# Patient Record
Sex: MALE | Race: WHITE | NOT HISPANIC OR LATINO | Employment: STUDENT | URBAN - METROPOLITAN AREA
[De-identification: names, ages, dates, MRNs, and addresses within clinical notes are randomized per-mention and may not be internally consistent; named-entity substitution may affect disease eponyms.]

---

## 2021-07-16 RX ORDER — SERTRALINE HYDROCHLORIDE 25 MG/1
25 TABLET, FILM COATED ORAL DAILY
COMMUNITY
Start: 2021-06-22 | End: 2021-09-25

## 2021-07-19 ENCOUNTER — OFFICE VISIT (OUTPATIENT)
Dept: FAMILY MEDICINE CLINIC | Facility: CLINIC | Age: 36
End: 2021-07-19
Payer: COMMERCIAL

## 2021-07-19 VITALS
TEMPERATURE: 97.5 F | BODY MASS INDEX: 29.39 KG/M2 | OXYGEN SATURATION: 98 % | HEART RATE: 54 BPM | WEIGHT: 217 LBS | HEIGHT: 72 IN | SYSTOLIC BLOOD PRESSURE: 162 MMHG | DIASTOLIC BLOOD PRESSURE: 84 MMHG | RESPIRATION RATE: 18 BRPM

## 2021-07-19 DIAGNOSIS — Z13.29 SCREENING FOR THYROID DISORDER: ICD-10-CM

## 2021-07-19 DIAGNOSIS — Z13.220 SCREENING FOR HYPERLIPIDEMIA: ICD-10-CM

## 2021-07-19 DIAGNOSIS — Z13.31 DEPRESSION SCREENING NEGATIVE: ICD-10-CM

## 2021-07-19 DIAGNOSIS — F32.A DEPRESSIVE DISORDER: ICD-10-CM

## 2021-07-19 DIAGNOSIS — Z13.1 SCREENING FOR DIABETES MELLITUS (DM): ICD-10-CM

## 2021-07-19 DIAGNOSIS — Z11.1 VISIT FOR TB SKIN TEST: ICD-10-CM

## 2021-07-19 DIAGNOSIS — M54.12 CERVICAL RADICULOPATHY: ICD-10-CM

## 2021-07-19 DIAGNOSIS — F41.9 ANXIETY: ICD-10-CM

## 2021-07-19 DIAGNOSIS — Z11.59 ENCOUNTER FOR HEPATITIS C SCREENING TEST FOR LOW RISK PATIENT: ICD-10-CM

## 2021-07-19 DIAGNOSIS — Z11.4 SCREENING FOR HIV (HUMAN IMMUNODEFICIENCY VIRUS): ICD-10-CM

## 2021-07-19 DIAGNOSIS — F43.10 PTSD (POST-TRAUMATIC STRESS DISORDER): ICD-10-CM

## 2021-07-19 DIAGNOSIS — Z76.89 ENCOUNTER TO ESTABLISH CARE: Primary | ICD-10-CM

## 2021-07-19 DIAGNOSIS — Z13.0 SCREENING FOR DEFICIENCY ANEMIA: ICD-10-CM

## 2021-07-19 PROCEDURE — 99203 OFFICE O/P NEW LOW 30 MIN: CPT | Performed by: NURSE PRACTITIONER

## 2021-07-19 PROCEDURE — 3725F SCREEN DEPRESSION PERFORMED: CPT | Performed by: NURSE PRACTITIONER

## 2021-07-19 NOTE — PROGRESS NOTES
Assessment/Plan:    Problem List Items Addressed This Visit     Depressive disorder    Relevant Medications    sertraline (ZOLOFT) 50 mg tablet    Anxiety    Cervical radiculopathy    PTSD (post-traumatic stress disorder)    Relevant Medications    sertraline (ZOLOFT) 50 mg tablet    Adult BMI 29 0-29 9 kg/sq m      Other Visit Diagnoses     Encounter to establish care    -  Primary    Screening for deficiency anemia        Relevant Orders    CBC and differential    Screening for diabetes mellitus (DM)        Relevant Orders    Comprehensive metabolic panel    Screening for hyperlipidemia        Relevant Orders    Lipid panel    Screening for thyroid disorder        Relevant Orders    TSH, 3rd generation with Free T4 reflex    Encounter for hepatitis C screening test for low risk patient        Relevant Orders    Hepatitis C antibody    Screening for HIV (human immunodeficiency virus)        Relevant Orders    HIV 1/2 Antigen/Antibody (4th Generation) w Reflex [facility]    Visit for TB skin test        Relevant Orders    Quantiferon TB Gold Plus    Depression screening negative               Diagnoses and all orders for this visit:    Encounter to establish care    Screening for deficiency anemia  -     CBC and differential; Future    Screening for diabetes mellitus (DM)  -     Comprehensive metabolic panel; Future    Screening for hyperlipidemia  -     Lipid panel; Future    Screening for thyroid disorder  -     TSH, 3rd generation with Free T4 reflex; Future    Encounter for hepatitis C screening test for low risk patient  -     Hepatitis C antibody; Future    Screening for HIV (human immunodeficiency virus)  -     HIV 1/2 Antigen/Antibody (4th Generation) w Reflex [facility]; Future    Visit for TB skin test  -     Quantiferon TB Gold Plus;  Future    PTSD (post-traumatic stress disorder)    Cervical radiculopathy    Anxiety    Depressive disorder    Adult BMI 29 0-29 9 kg/sq m    Depression screening negative    Other orders  -     sertraline (ZOLOFT) 50 mg tablet; TAKE ONE-HALF TABLET BY MOUTH EVERY DAY FOR MOOD        No problem-specific Assessment & Plan notes found for this encounter  Subjective:      Patient ID: Lai Romeo is a 37 y o  male  Presents today to establish care at this office and routine visit  Previous PCP At [facility]  Significant Hx of PTSD, Anxiety,and depression  He is not currently prescribed any medications for these condiitons  Needs physical for [employer] school  Pt doing well overall, offers no acute complaints today  Discussed with Lai Romeo CDC recommendations for HIV screening for Patients between 13 y o  and 72 y o  Noel Ronni acknowledges the test and order for HIV screening placed  Risks and benefits of Hep C testing discussed to screen for Hep C infection  Lai Romeo agreeable to the test and order for Hep C ab placed  The following portions of the patient's history were reviewed and updated as appropriate:   He has a past medical history of Asthma ,  does not have any pertinent problems on file  ,   has a past surgical history that includes Bone Resection, Humerus (Right); Eye surgery (Bilateral); and TONSILECTOMY AND ADNOIDECTOMY  ,  family history includes Prostate cancer in his father  ,   reports that he has never smoked  His smokeless tobacco use includes chew  No history on file for alcohol use and drug use ,  has No Known Allergies     Current Outpatient Medications   Medication Sig Dispense Refill    sertraline (ZOLOFT) 50 mg tablet TAKE ONE-HALF TABLET BY MOUTH EVERY DAY FOR MOOD       No current facility-administered medications for this visit  BMI Counseling: Body mass index is 29 43 kg/m²  The BMI is above normal  Nutrition recommendations include decreasing portion sizes, consuming healthier snacks, limiting drinks that contain sugar, moderation in carbohydrate intake and reducing intake of cholesterol   Exercise recommendations include exercising 3-5 times per week  No pharmacotherapy was ordered  Depression Screening and Follow-up Plan: Patient's depression screening was positive with a PHQ-2 score of 0  Clincally patient does not have depression  No treatment is required  Review of Systems   Constitutional: Negative  HENT: Negative  Eyes: Negative  Respiratory: Negative  Cardiovascular: Negative  Gastrointestinal: Negative  Endocrine: Negative  Genitourinary: Negative  Musculoskeletal: Negative  Skin: Negative  Allergic/Immunologic: Negative  Neurological: Negative  Hematological: Negative  Psychiatric/Behavioral: Negative  Objective:  Vitals:    07/19/21 0720   BP: 162/84   Pulse: (!) 54   Resp: 18   Temp: 97 5 °F (36 4 °C)   SpO2: 98%   Weight: 98 4 kg (217 lb)   Height: 6' (1 829 m)     Body mass index is 29 43 kg/m²  Physical Exam  Vitals and nursing note reviewed  Constitutional:       Appearance: Normal appearance  He is well-developed  HENT:      Head: Normocephalic and atraumatic  Right Ear: Tympanic membrane, ear canal and external ear normal       Left Ear: Tympanic membrane, ear canal and external ear normal       Nose: Nose normal       Mouth/Throat:      Mouth: Mucous membranes are moist       Pharynx: Uvula midline  Eyes:      General: Lids are normal       Conjunctiva/sclera: Conjunctivae normal       Pupils: Pupils are equal, round, and reactive to light  Neck:      Thyroid: No thyroid mass  Vascular: No JVD  Trachea: Trachea and phonation normal    Cardiovascular:      Rate and Rhythm: Normal rate and regular rhythm  Pulses: Normal pulses  Heart sounds: Normal heart sounds, S1 normal and S2 normal  No murmur heard  No friction rub  No gallop  Pulmonary:      Effort: Pulmonary effort is normal       Breath sounds: Normal breath sounds  Abdominal:      General: Bowel sounds are normal       Palpations: Abdomen is soft  Tenderness: There is no abdominal tenderness  Genitourinary:     Comments: Deferred  Musculoskeletal:         General: Normal range of motion  Cervical back: Full passive range of motion without pain, normal range of motion and neck supple  Right lower leg: No edema  Left lower leg: No edema  Lymphadenopathy:      Head:      Right side of head: No submental, submandibular, tonsillar, preauricular, posterior auricular or occipital adenopathy  Left side of head: No submental, submandibular, tonsillar, preauricular, posterior auricular or occipital adenopathy  Cervical: No cervical adenopathy  Skin:     General: Skin is warm and dry  Capillary Refill: Capillary refill takes less than 2 seconds  Neurological:      General: No focal deficit present  Mental Status: He is alert and oriented to person, place, and time  Cranial Nerves: Cranial nerves are intact  Sensory: Sensation is intact  Motor: Motor function is intact  Coordination: Coordination is intact  Gait: Gait is intact  Psychiatric:         Attention and Perception: Attention and perception normal          Mood and Affect: Mood and affect normal          Speech: Speech normal          Behavior: Behavior normal  Behavior is cooperative  Thought Content:  Thought content normal          Cognition and Memory: Cognition normal          Judgment: Judgment normal

## 2021-07-20 ENCOUNTER — APPOINTMENT (OUTPATIENT)
Dept: LAB | Facility: CLINIC | Age: 36
End: 2021-07-20
Payer: COMMERCIAL

## 2021-07-20 DIAGNOSIS — Z13.1 SCREENING FOR DIABETES MELLITUS (DM): ICD-10-CM

## 2021-07-20 DIAGNOSIS — Z13.29 SCREENING FOR THYROID DISORDER: ICD-10-CM

## 2021-07-20 DIAGNOSIS — Z13.220 SCREENING FOR HYPERLIPIDEMIA: ICD-10-CM

## 2021-07-20 DIAGNOSIS — Z11.59 ENCOUNTER FOR HEPATITIS C SCREENING TEST FOR LOW RISK PATIENT: ICD-10-CM

## 2021-07-20 DIAGNOSIS — Z11.4 SCREENING FOR HIV (HUMAN IMMUNODEFICIENCY VIRUS): ICD-10-CM

## 2021-07-20 DIAGNOSIS — Z13.0 SCREENING FOR DEFICIENCY ANEMIA: ICD-10-CM

## 2021-07-20 DIAGNOSIS — Z11.1 VISIT FOR TB SKIN TEST: ICD-10-CM

## 2021-07-20 LAB
ALBUMIN SERPL BCP-MCNC: 4.3 G/DL (ref 3.5–5)
ALP SERPL-CCNC: 64 U/L (ref 46–116)
ALT SERPL W P-5'-P-CCNC: 29 U/L (ref 12–78)
ANION GAP SERPL CALCULATED.3IONS-SCNC: 4 MMOL/L (ref 4–13)
AST SERPL W P-5'-P-CCNC: 17 U/L (ref 5–45)
BASOPHILS # BLD AUTO: 0.04 THOUSANDS/ΜL (ref 0–0.1)
BASOPHILS NFR BLD AUTO: 1 % (ref 0–1)
BILIRUB SERPL-MCNC: 0.51 MG/DL (ref 0.2–1)
BUN SERPL-MCNC: 14 MG/DL (ref 5–25)
CALCIUM SERPL-MCNC: 9.5 MG/DL (ref 8.3–10.1)
CHLORIDE SERPL-SCNC: 105 MMOL/L (ref 100–108)
CHOLEST SERPL-MCNC: 204 MG/DL (ref 50–200)
CO2 SERPL-SCNC: 28 MMOL/L (ref 21–32)
CREAT SERPL-MCNC: 1.06 MG/DL (ref 0.6–1.3)
EOSINOPHIL # BLD AUTO: 0.2 THOUSAND/ΜL (ref 0–0.61)
EOSINOPHIL NFR BLD AUTO: 4 % (ref 0–6)
ERYTHROCYTE [DISTWIDTH] IN BLOOD BY AUTOMATED COUNT: 13.2 % (ref 11.6–15.1)
GFR SERPL CREATININE-BSD FRML MDRD: 92 ML/MIN/1.73SQ M
GLUCOSE P FAST SERPL-MCNC: 90 MG/DL (ref 65–99)
HCT VFR BLD AUTO: 47.5 % (ref 36.5–49.3)
HCV AB SER QL: NORMAL
HDLC SERPL-MCNC: 86 MG/DL
HGB BLD-MCNC: 15.7 G/DL (ref 12–17)
IMM GRANULOCYTES # BLD AUTO: 0.01 THOUSAND/UL (ref 0–0.2)
IMM GRANULOCYTES NFR BLD AUTO: 0 % (ref 0–2)
LDLC SERPL CALC-MCNC: 93 MG/DL (ref 0–100)
LYMPHOCYTES # BLD AUTO: 1.77 THOUSANDS/ΜL (ref 0.6–4.47)
LYMPHOCYTES NFR BLD AUTO: 31 % (ref 14–44)
MCH RBC QN AUTO: 29.2 PG (ref 26.8–34.3)
MCHC RBC AUTO-ENTMCNC: 33.1 G/DL (ref 31.4–37.4)
MCV RBC AUTO: 89 FL (ref 82–98)
MONOCYTES # BLD AUTO: 0.36 THOUSAND/ΜL (ref 0.17–1.22)
MONOCYTES NFR BLD AUTO: 6 % (ref 4–12)
NEUTROPHILS # BLD AUTO: 3.36 THOUSANDS/ΜL (ref 1.85–7.62)
NEUTS SEG NFR BLD AUTO: 58 % (ref 43–75)
NONHDLC SERPL-MCNC: 118 MG/DL
NRBC BLD AUTO-RTO: 0 /100 WBCS
PLATELET # BLD AUTO: 194 THOUSANDS/UL (ref 149–390)
PMV BLD AUTO: 10.5 FL (ref 8.9–12.7)
POTASSIUM SERPL-SCNC: 4.2 MMOL/L (ref 3.5–5.3)
PROT SERPL-MCNC: 8.1 G/DL (ref 6.4–8.2)
RBC # BLD AUTO: 5.37 MILLION/UL (ref 3.88–5.62)
SODIUM SERPL-SCNC: 137 MMOL/L (ref 136–145)
TRIGL SERPL-MCNC: 124 MG/DL
TSH SERPL DL<=0.05 MIU/L-ACNC: 1.89 UIU/ML (ref 0.36–3.74)
WBC # BLD AUTO: 5.74 THOUSAND/UL (ref 4.31–10.16)

## 2021-07-20 PROCEDURE — 86480 TB TEST CELL IMMUN MEASURE: CPT

## 2021-07-20 PROCEDURE — 87389 HIV-1 AG W/HIV-1&-2 AB AG IA: CPT

## 2021-07-20 PROCEDURE — 86803 HEPATITIS C AB TEST: CPT

## 2021-07-20 PROCEDURE — 36415 COLL VENOUS BLD VENIPUNCTURE: CPT

## 2021-07-20 PROCEDURE — 80061 LIPID PANEL: CPT

## 2021-07-20 PROCEDURE — 84443 ASSAY THYROID STIM HORMONE: CPT

## 2021-07-20 PROCEDURE — 85025 COMPLETE CBC W/AUTO DIFF WBC: CPT

## 2021-07-20 PROCEDURE — 80053 COMPREHEN METABOLIC PANEL: CPT

## 2021-07-22 LAB
GAMMA INTERFERON BACKGROUND BLD IA-ACNC: 0.02 IU/ML
M TB IFN-G BLD-IMP: NEGATIVE
M TB IFN-G CD4+ BCKGRND COR BLD-ACNC: 0 IU/ML
M TB IFN-G CD4+ BCKGRND COR BLD-ACNC: 0 IU/ML
MITOGEN IGNF BCKGRD COR BLD-ACNC: >10 IU/ML

## 2021-07-24 LAB — HIV 1+2 AB+HIV1 P24 AG SERPL QL IA: NORMAL

## 2021-07-29 ENCOUNTER — OFFICE VISIT (OUTPATIENT)
Dept: FAMILY MEDICINE CLINIC | Facility: CLINIC | Age: 36
End: 2021-07-29
Payer: COMMERCIAL

## 2021-07-29 VITALS
HEIGHT: 72 IN | WEIGHT: 210 LBS | SYSTOLIC BLOOD PRESSURE: 128 MMHG | OXYGEN SATURATION: 97 % | TEMPERATURE: 96.7 F | HEART RATE: 52 BPM | BODY MASS INDEX: 28.44 KG/M2 | DIASTOLIC BLOOD PRESSURE: 84 MMHG

## 2021-07-29 DIAGNOSIS — F32.A DEPRESSIVE DISORDER: ICD-10-CM

## 2021-07-29 DIAGNOSIS — Z13.220 SCREENING FOR HYPERLIPIDEMIA: ICD-10-CM

## 2021-07-29 DIAGNOSIS — F43.10 PTSD (POST-TRAUMATIC STRESS DISORDER): ICD-10-CM

## 2021-07-29 DIAGNOSIS — Z00.00 ANNUAL PHYSICAL EXAM: Primary | ICD-10-CM

## 2021-07-29 DIAGNOSIS — F41.9 ANXIETY: ICD-10-CM

## 2021-07-29 DIAGNOSIS — Z13.29 SCREENING FOR THYROID DISORDER: ICD-10-CM

## 2021-07-29 DIAGNOSIS — Z13.0 SCREENING FOR DEFICIENCY ANEMIA: ICD-10-CM

## 2021-07-29 DIAGNOSIS — Z13.1 SCREENING FOR DIABETES MELLITUS: ICD-10-CM

## 2021-07-29 PROCEDURE — 1036F TOBACCO NON-USER: CPT | Performed by: NURSE PRACTITIONER

## 2021-07-29 PROCEDURE — 3008F BODY MASS INDEX DOCD: CPT | Performed by: NURSE PRACTITIONER

## 2021-07-29 PROCEDURE — 99395 PREV VISIT EST AGE 18-39: CPT | Performed by: NURSE PRACTITIONER

## 2021-07-29 NOTE — PROGRESS NOTES
ADULT ANNUAL [facility] PRIMARY CARE    NAME: Holden Gomez  AGE: 37 y o  SEX: male  : 1985     DATE: 2021     Assessment and Plan:     Problem List Items Addressed This Visit     Depressive disorder    Anxiety    PTSD (post-traumatic stress disorder)      Other Visit Diagnoses     Annual physical exam    -  Primary    Screening for deficiency anemia        Relevant Orders    CBC and differential    Screening for diabetes mellitus        Relevant Orders    Comprehensive metabolic panel    Screening for thyroid disorder        Relevant Orders    TSH, 3rd generation with Free T4 reflex    Screening for hyperlipidemia        Relevant Orders    Lipid Panel with Direct LDL reflex          Immunizations and preventive care screenings were discussed with patient today  Appropriate education was printed on patient's after visit summary  Counseling:  Alcohol/drug use: discussed moderation in alcohol intake, the recommendations for healthy alcohol use, and avoidance of illicit drug use  Dental Health: discussed importance of regular tooth brushing, flossing, and dental visits  Injury prevention: discussed safety/seat belts, safety helmets, smoke detectors, carbon dioxide detectors, and smoking near bedding or upholstery  Sexual health: discussed sexually transmitted diseases, partner selection, use of condoms, avoidance of unintended pregnancy, and contraceptive alternatives  · Exercise: the importance of regular exercise/physical activity was discussed  Recommend exercise 3-5 times per week for at least 30 minutes  Return in about 1 year (around 2022) for PUT ON FLU VACC LIST, Schedule in JT  Chief Complaint:     Chief Complaint   Patient presents with    Annual Exam      History of Present Illness:     Adult Annual Physical   Patient here for a comprehensive physical exam  The patient reports no problems      Diet and Physical Activity  · Diet/Nutrition: well balanced diet, limited junk food, consuming 3-5 servings of fruits/vegetables daily and adequate fiber intake  · Exercise: vigorous cardiovascular exercise, strength training exercises, 5-7 times a week on average and 1-2 hours on average  Depression Screening  PHQ-9 Depression Screening    PHQ-9:   Frequency of the following problems over the past two weeks:           General Health  · Sleep: sleeps poorly and gets 4-6 hours of sleep on average  · Hearing: normal - bilateral and has chronic tinnitis  · Vision: no vision problems and most recent eye exam >1 year ago  · Dental: no dental visits for >1 year, brushes teeth twice daily and flosses teeth occasionally   Health  · History of STDs?: yes  [facility]  Review of Systems:     Review of Systems   Constitutional: Negative  HENT: Negative  Eyes: Negative  Respiratory: Negative  Cardiovascular: Negative  Gastrointestinal: Negative  Endocrine: Negative  Genitourinary: Negative  Musculoskeletal: Negative  Skin: Negative  Allergic/Immunologic: Negative  Neurological: Negative  Hematological: Negative  Psychiatric/Behavioral: Negative  Past Medical History:     Past Medical History:   Diagnosis Date    Asthma       Past Surgical History:     Past Surgical History:   Procedure Laterality Date    BONE RESECTION, HUMERUS Right     EYE SURGERY Bilateral     TONSILECTOMY AND ADNOIDECTOMY        Social History:     Social History     Socioeconomic History    Marital status: /Civil Union     Spouse name: None    Number of children: None    Years of education: None    Highest education level: None   Occupational History    Occupation: unemployed    Tobacco Use    Smoking status: Never Smoker    Smokeless tobacco: Current User     Types: Chew   Vaping Use    Vaping Use: Never used   Substance and Sexual Activity    Alcohol use:  Yes Comment: 6 pk week     Drug use: Not Currently    Sexual activity: None   Other Topics Concern    None   Social History Narrative    None     Social Determinants of Health     Financial Resource Strain:     Difficulty of Paying Living Expenses:    Food Insecurity:     Worried About Running Out of Food in the Last Year:     Ran Out of Food in the Last Year:    Transportation Needs:     Lack of Transportation (Medical):  Lack of Transportation (Non-Medical):    Physical Activity:     Days of Exercise per Week:     Minutes of Exercise per Session:    Stress:     Feeling of Stress :    Social Connections:     Frequency of Communication with Friends and Family:     Frequency of Social Gatherings with Friends and Family:     Attends Protestant Services:     Active Member of Clubs or Organizations:     Attends Club or Organization Meetings:     Marital Status:    Intimate Partner Violence:     Fear of Current or Ex-Partner:     Emotionally Abused:     Physically Abused:     Sexually Abused:       Family History:     Family History   Problem Relation Age of Onset    Prostate cancer Father       Current Medications:     Current Outpatient Medications   Medication Sig Dispense Refill    sertraline (ZOLOFT) 25 mg tablet Take 25 mg by mouth daily        No current facility-administered medications for this visit  Allergies:     No Known Allergies   Physical Exam:     /84 (BP Location: Left arm, Patient Position: Sitting, Cuff Size: Large)   Pulse (!) 52   Temp (!) 96 7 °F (35 9 °C) (Tympanic)   Ht 6' (1 829 m)   Wt 95 3 kg (210 lb)   SpO2 97%   BMI 28 48 kg/m²     Physical Exam  Vitals and nursing note reviewed  Constitutional:       Appearance: Normal appearance  He is well-developed  HENT:      Head: Normocephalic and atraumatic        Right Ear: External ear normal       Left Ear: External ear normal       Nose: Nose normal    Eyes:      Conjunctiva/sclera: Conjunctivae normal  Pupils: Pupils are equal, round, and reactive to light  Cardiovascular:      Rate and Rhythm: Normal rate and regular rhythm  Heart sounds: Normal heart sounds  Pulmonary:      Effort: Pulmonary effort is normal       Breath sounds: Normal breath sounds  Abdominal:      General: Bowel sounds are normal       Palpations: Abdomen is soft  Genitourinary:     Comments: Deferred  Musculoskeletal:         General: Normal range of motion  Cervical back: Normal range of motion and neck supple  Skin:     General: Skin is warm and dry  Capillary Refill: Capillary refill takes less than 2 seconds  Neurological:      Mental Status: He is alert and oriented to person, place, and time  Psychiatric:         Behavior: Behavior normal          Thought Content:  Thought content normal          Judgment: Judgment normal           Appointment on 07/20/2021   Component Date Value Ref Range Status    WBC 07/20/2021 5 74  4 31 - 10 16 Thousand/uL Final    RBC 07/20/2021 5 37  3 88 - 5 62 Million/uL Final    Hemoglobin 07/20/2021 15 7  12 0 - 17 0 g/dL Final    Hematocrit 07/20/2021 47 5  36 5 - 49 3 % Final    MCV 07/20/2021 89  82 - 98 fL Final    MCH 07/20/2021 29 2  26 8 - 34 3 pg Final    MCHC 07/20/2021 33 1  31 4 - 37 4 g/dL Final    RDW 07/20/2021 13 2  11 6 - 15 1 % Final    MPV 07/20/2021 10 5  8 9 - 12 7 fL Final    Platelets 21/67/2596 194  149 - 390 Thousands/uL Final    nRBC 07/20/2021 0  /100 WBCs Final    Neutrophils Relative 07/20/2021 58  43 - 75 % Final    Immat GRANS % 07/20/2021 0  0 - 2 % Final    Lymphocytes Relative 07/20/2021 31  14 - 44 % Final    Monocytes Relative 07/20/2021 6  4 - 12 % Final    Eosinophils Relative 07/20/2021 4  0 - 6 % Final    Basophils Relative 07/20/2021 1  0 - 1 % Final    Neutrophils Absolute 07/20/2021 3 36  1 85 - 7 62 Thousands/µL Final    Immature Grans Absolute 07/20/2021 0 01  0 00 - 0 20 Thousand/uL Final    Lymphocytes Absolute 07/20/2021 1 77  0 60 - 4 47 Thousands/µL Final    Monocytes Absolute 07/20/2021 0 36  0 17 - 1 22 Thousand/µL Final    Eosinophils Absolute 07/20/2021 0 20  0 00 - 0 61 Thousand/µL Final    Basophils Absolute 07/20/2021 0 04  0 00 - 0 10 Thousands/µL Final    Sodium 07/20/2021 137  136 - 145 mmol/L Final    Potassium 07/20/2021 4 2  3 5 - 5 3 mmol/L Final    Chloride 07/20/2021 105  100 - 108 mmol/L Final    CO2 07/20/2021 28  21 - 32 mmol/L Final    ANION GAP 07/20/2021 4  4 - 13 mmol/L Final    BUN 07/20/2021 14  5 - 25 mg/dL Final    Creatinine 07/20/2021 1 06  0 60 - 1 30 mg/dL Final    Standardized to IDMS reference method    Glucose, Fasting 07/20/2021 90  65 - 99 mg/dL Final    Specimen collection should occur prior to Sulfasalazine administration due to the potential for falsely depressed results  Specimen collection should occur prior to Sulfapyridine administration due to the potential for falsely elevated results   Calcium 07/20/2021 9 5  8 3 - 10 1 mg/dL Final    AST 07/20/2021 17  5 - 45 U/L Final    Specimen collection should occur prior to Sulfasalazine administration due to the potential for falsely depressed results   ALT 07/20/2021 29  12 - 78 U/L Final    Specimen collection should occur prior to Sulfasalazine and/or Sulfapyridine administration due to the potential for falsely depressed results   Alkaline Phosphatase 07/20/2021 64  46 - 116 U/L Final    Total Protein 07/20/2021 8 1  6 4 - 8 2 g/dL Final    Albumin 07/20/2021 4 3  3 5 - 5 0 g/dL Final    Total Bilirubin 07/20/2021 0 51  0 20 - 1 00 mg/dL Final    Use of this assay is not recommended for patients undergoing treatment with eltrombopag due to the potential for falsely elevated results      eGFR 07/20/2021 92  ml/min/1 73sq m Final    Cholesterol 07/20/2021 204* 50 - 200 mg/dL Final    Cholesterol:       Desirable         <200 mg/dl       Borderline         200-239 mg/dl       High              >239     Triglycerides 07/20/2021 124  <=150 mg/dL Final    Triglyceride:     Normal          <150 mg/dl     Borderline High 150-199 mg/dl     High            200-499 mg/dl        Very High       >499 mg/dl    Specimen collection should occur prior to N-Acetylcysteine or Metamizole administration due to the potential for falsely depressed results   HDL, Direct 07/20/2021 86  >=40 mg/dL Final    HDL Cholesterol:       Low     <41 mg/dL  Specimen collection should occur prior to Metamizole administration due to the potential for falsley depressed results   LDL Calculated 07/20/2021 93  0 - 100 mg/dL Final    LDL Cholesterol:     Optimal           <100 mg/dl     Near Optimal      100-129 mg/dl     Above Optimal       Borderline High 130-159 mg/dl       High            160-189 mg/dl       Very High       >189 mg/dl         This screening LDL is a calculated result  It does not have the accuracy of the Direct Measured LDL in the monitoring of patients with hyperlipidemia and/or statin therapy  Direct Measure LDL (EUN055) must be ordered separately in these patients   Non-HDL-Chol (CHOL-HDL) 07/20/2021 118  mg/dl Final    TSH 3RD GENERATON 07/20/2021 1 890  0 358 - 3 740 uIU/mL Final    Hepatitis C Ab 07/20/2021 Non-reactive  Non-reactive Final    HIV-1/HIV-2 Ab 07/20/2021 Non-Reactive  Non-Reactive Final    QFT Nil 07/20/2021 0 02  0 - 8 0 IU/ml Final    QFT TB1-NIL 07/20/2021 0 00  IU/ml Final    QFT TB2-NIL 07/20/2021 0 00  IU/ml Final    QFT Mitogen-NIL 07/20/2021 >10 00  IU/ml Final    QFT Final Interpretation 07/20/2021 Negative  Negative Final    No Interferon-gamma response to M  tuberculosis antigens detected  Infection with M  tuberculosis is unlikely  A single negative result does not exclude infection with M  tuberculosis   In patients at high risk for M  tuberculosis infection, a second test should be considered in accordance with the 2017 ATS/IDSA/CDC Clinical Practice Guidelines for Diagnosis of Tuberculosis in Adults and Children  False negative results can be a result of incorrect blood sample collection or handling of the specimen affecting lymphocyte function  Elevated total cholesterol - recommend lifestyle and dietary changes which include plant based diet with white lean meats, no frying, EVOO on vegetables instead of butter, nuts, fruits, and hydration with water with a regular moderate exercise regimen 20 to 30 minutes three times a week          JARETT Spencer   [place] [id number] [facility] PRIMARY [facility]

## 2021-07-29 NOTE — PATIENT INSTRUCTIONS
Wellness Visit for Adults   AMBULATORY CARE:   A wellness visit  is when you see your healthcare provider to get screened for health problems  Your healthcare provider will also give you advice on how to stay healthy  Write down your questions so you remember to ask them  Ask your healthcare provider how often you should have a wellness visit  What happens at a wellness visit:  Your healthcare provider will ask about your health, and your family history of health problems  This includes high blood pressure, heart disease, and cancer  He or she will ask if you have symptoms that concern you, if you smoke, and about your mood  You may also be asked about your intake of medicines, supplements, food, and alcohol  Any of the following may be done:  · Your weight  will be checked  Your height may also be checked so your body mass index (BMI) can be calculated  Your BMI shows if you are at a healthy weight  · Your blood pressure  and heart rate will be checked  Your temperature may also be checked  · Blood and urine tests  may be done  Blood tests may be done to check your cholesterol levels  Abnormal cholesterol levels increase your risk for heart disease and stroke  You may also need a blood or urine test to check for diabetes if you are at increased risk  Urine tests may be done to look for signs of an infection or kidney disease  · A physical exam  includes checking your heartbeat and lungs with a stethoscope  Your healthcare provider may also check your skin to look for sun damage  · Screening tests  may be recommended  A screening test is done to check for diseases that may not cause symptoms  The screening tests you may need depend on your age, gender, family history, and lifestyle habits  For example, colorectal screening may be recommended if you are 48years old or older  Screening tests you need if you are a woman:   · A Pap smear  is used to screen for cervical cancer   Pap smears are usually done every 3 to 5 years depending on your age  You may need them more often if you have had abnormal Pap smear test results in the past  Ask your healthcare provider how often you should have a Pap smear  · A mammogram  is an x-ray of your breasts to screen for breast cancer  Experts recommend mammograms every 2 years starting at age 48 years  You may need a mammogram at age 52 years or younger if you have an increased risk for breast cancer  Talk to your healthcare provider about when you should start having mammograms and how often you need them  Vaccines you may need:   · Get an influenza vaccine  every year  The influenza vaccine protects you from the flu  Several types of viruses cause the flu  The viruses change over time, so new vaccines are made each year  · Get a tetanus-diphtheria (Td) booster vaccine  every 10 years  This vaccine protects you against tetanus and diphtheria  Tetanus is a severe infection that may cause painful muscle spasms and lockjaw  Diphtheria is a severe bacterial infection that causes a thick covering in the back of your mouth and throat  · Get a human papillomavirus (HPV) vaccine  if you are female and aged 23 to 32 or male 23 to 24 and never received it  This vaccine protects you from HPV infection  HPV is the most common infection spread by sexual contact  HPV may also cause vaginal, penile, and anal cancers  · Get a pneumococcal vaccine  if you are aged 72 years or older  The pneumococcal vaccine is an injection given to protect you from pneumococcal disease  Pneumococcal disease is an infection caused by pneumococcal bacteria  The infection may cause pneumonia, meningitis, or an ear infection  · Get a shingles vaccine  if you are 60 or older, even if you have had shingles before  The shingles vaccine is an injection to protect you from the varicella-zoster virus  This is the same virus that causes chickenpox   Shingles is a painful rash that develops in people who had chickenpox or have been exposed to the virus  How to eat healthy:  My Plate is a model for planning healthy meals  It shows the types and amounts of foods that should go on your plate  Fruits and vegetables make up about half of your plate, and grains and protein make up the other half  A serving of dairy is included on the side of your plate  The amount of calories and serving sizes you need depends on your age, gender, weight, and height  Examples of healthy foods are listed below:  · Eat a variety of vegetables  such as dark green, red, and orange vegetables  You can also include canned vegetables low in sodium (salt) and frozen vegetables without added butter or sauces  · Eat a variety of fresh fruits , canned fruit in 100% juice, frozen fruit, and dried fruit  · Include whole grains  At least half of the grains you eat should be whole grains  Examples include whole-wheat bread, wheat pasta, brown rice, and whole-grain cereals such as oatmeal     · Eat a variety of protein foods such as seafood (fish and shellfish), lean meat, and poultry without skin (turkey and chicken)  Examples of lean meats include pork leg, shoulder, or tenderloin, and beef round, sirloin, tenderloin, and extra lean ground beef  Other protein foods include eggs and egg substitutes, beans, peas, soy products, nuts, and seeds  · Choose low-fat dairy products such as skim or 1% milk or low-fat yogurt, cheese, and cottage cheese  · Limit unhealthy fats  such as butter, hard margarine, and shortening  Exercise:  Exercise at least 30 minutes per day on most days of the week  Some examples of exercise include walking, biking, dancing, and swimming  You can also fit in more physical activity by taking the stairs instead of the elevator or parking farther away from stores  Include muscle strengthening activities 2 days each week  Regular exercise provides many health benefits   It helps you manage your weight, and decreases your risk for type 2 diabetes, heart disease, stroke, and high blood pressure  Exercise can also help improve your mood  Ask your healthcare provider about the best exercise plan for you  General health and safety guidelines:   · Do not smoke  Nicotine and other chemicals in cigarettes and cigars can cause lung damage  Ask your healthcare provider for information if you currently smoke and need help to quit  E-cigarettes or smokeless tobacco still contain nicotine  Talk to your healthcare provider before you use these products  · Limit alcohol  A drink of alcohol is 12 ounces of beer, 5 ounces of wine, or 1½ ounces of liquor  · Lose weight, if needed  Being overweight increases your risk of certain health conditions  These include heart disease, high blood pressure, type 2 diabetes, and certain types of cancer  · Protect your skin  Do not sunbathe or use tanning beds  Use sunscreen with a SPF 15 or higher  Apply sunscreen at least 15 minutes before you go outside  Reapply sunscreen every 2 hours  Wear protective clothing, hats, and sunglasses when you are outside  · Drive safely  Always wear your seatbelt  Make sure everyone in your car wears a seatbelt  A seatbelt can save your life if you are in an accident  Do not use your cell phone when you are driving  This could distract you and cause an accident  Pull over if you need to make a call or send a text message  · Practice safe sex  Use latex condoms if are sexually active and have more than one partner  Your healthcare provider may recommend screening tests for sexually transmitted infections (STIs)  · Wear helmets, lifejackets, and protective gear  Always wear a helmet when you ride a bike or motorcycle, go skiing, or play sports that could cause a head injury  Wear protective equipment when you play sports  Wear a lifejacket when you are on a boat or doing water sports      © Copyright [employer] 2021 Information is for End User's use only and may not be sold, redistributed or otherwise used for commercial purposes  All illustrations and images included in CareNotes® are the copyrighted property of A D A M [employer] Inc  or Amber Youssef  The above information is an  only  It is not intended as medical advice for individual conditions or treatments  Talk to your doctor, nurse or pharmacist before following any medical regimen to see if it is safe and effective for you  Cholesterol and Your Health   AMBULATORY CARE:   Cholesterol  is a waxy, fat-like substance  Your body uses cholesterol to make hormones and new cells, and to protect nerves  Cholesterol is made by your body  It also comes from certain foods you eat, such as meat and dairy products  Your healthcare provider can help you set goals for your cholesterol levels  He or she can help you create a plan to meet your goals  Cholesterol level goals: Your cholesterol level goals depend on your risk for heart disease, your age, and your other health conditions  The following are general guidelines:  · Total cholesterol  includes low-density lipoprotein (LDL), high-density lipoprotein (HDL), and triglyceride levels  The total cholesterol level should be lower than 200 mg/dL and is best at about 150 mg/dL  · LDL cholesterol  is called bad cholesterol  because it forms plaque in your arteries  As plaque builds up, your arteries become narrow, and less blood flows through  When plaque decreases blood flow to your heart, you may have chest pain  If plaque completely blocks an artery that brings blood to your heart, you may have a heart attack  Plaque can break off and form blood clots  Blood clots may block arteries in your brain and cause a stroke  The level should be less than 130 mg/dL and is best at about 100 mg/dL  · HDL cholesterol  is called good cholesterol  because it helps remove LDL cholesterol from your arteries   It does this by attaching to LDL cholesterol and carrying it to your liver  Your liver breaks down LDL cholesterol so your body can get rid of it  High levels of HDL cholesterol can help prevent a heart attack and stroke  Low levels of HDL cholesterol can increase your risk for heart disease, heart attack, and stroke  The level should be 60 mg/dL or higher  · Triglycerides  are a type of fat that store energy from foods you eat  High levels of triglycerides also cause plaque buildup  This can increase your risk for a heart attack or stroke  If your triglyceride level is high, your LDL cholesterol level may also be high  The level should be less than 150 mg/dL  Any of the following can increase your risk for high cholesterol:   · Smoking cigarettes    · Being overweight or obese, or not getting enough exercise    · Drinking large amounts of alcohol    · A medical condition such as hypertension (high blood pressure) or diabetes    · Certain genes passed from your parents to you    · Age older than 65 years    What you need to know about having your cholesterol levels checked: Adults 21to 39years of age should have their cholesterol levels checked every 4 to 6 years  Adults 45 years or older should have their cholesterol checked every 1 to 2 years  You may need your cholesterol checked more often, or at a younger age, if you have risk factors for heart disease  You may also need to have your cholesterol checked more often if you have other health conditions, such as diabetes  Blood tests are used to check cholesterol levels  Blood tests measure your levels of triglycerides, LDL cholesterol, and HDL cholesterol  How healthy fats affect your cholesterol levels:  Healthy fats, also called unsaturated fats, help lower LDL cholesterol and triglyceride levels  Healthy fats include the following:  · Monounsaturated fats  are found in foods such as olive oil, canola oil, avocado, nuts, and olives      · Polyunsaturated fats,  such as omega 3 fats, are found in fish, such as salmon, trout, and tuna  They can also be found in plant foods such as flaxseed, walnuts, and soybeans  How unhealthy fats affect your cholesterol levels:  Unhealthy fats increase LDL cholesterol and triglyceride levels  They are found in foods high in cholesterol, saturated fat, and trans fat:  · Cholesterol  is found in eggs, dairy, and meat  · Saturated fat  is found in butter, cheese, ice cream, whole milk, and coconut oil  Saturated fat is also found in meat, such as sausage, hot dogs, and bologna  · Trans fat  is found in liquid oils and is used in fried and baked foods  Foods that contain trans fats include chips, crackers, muffins, sweet rolls, microwave popcorn, and cookies  Treatment  for high cholesterol will also decrease your risk of heart disease, heart attack, and stroke  Treatment may include any of the following:  · Lifestyle changes  may include food, exercise, weight loss, and quitting smoking  You may also need to decrease the amount of alcohol you drink  Your healthcare provider will want you to start with lifestyle changes  Other treatment may be added if lifestyle changes are not enough  Your healthcare provider may recommend you work with a team to manage hyperlipidemia  The team may include medical experts such as a dietitian, an exercise or physical therapist, and a behavior therapist  Your family members may be included in helping you create lifestyle changes  · Medicines  may be given to lower your LDL cholesterol, triglyceride levels, or total cholesterol level  You may need medicines to lower your cholesterol if any of the following is true:    ? You have a history of stroke, TIA, unstable angina, or a heart attack  ? Your LDL cholesterol level is 190 mg/dL or higher  ? You are age 36 to 76 years, have diabetes or heart disease risk factors, and your LDL cholesterol is 70 mg/dL or higher      · Supplements  include fish oil, red yeast rice, and garlic  Fish oil may help lower your triglyceride and LDL cholesterol levels  It may also increase your HDL cholesterol level  Red yeast rice may help decrease your total cholesterol level and LDL cholesterol level  Garlic may help lower your total cholesterol level  Do not take any supplements without talking to your healthcare provider  Food changes you can make to lower your cholesterol levels:  A dietitian can help you create a healthy eating plan  He or she can show you how to read food labels and choose foods low in saturated fat, trans fats, and cholesterol  · Decrease the total amount of fat you eat  Choose lean meats, fat-free or 1% fat milk, and low-fat dairy products, such as yogurt and cheese  Try to limit or avoid red meats  Limit or do not eat fried foods or baked goods, such as cookies  · Replace unhealthy fats with healthy fats  Cook foods in olive oil or canola oil  Choose soft margarines that are low in saturated fat and trans fat  Seeds, nuts, and avocados are other examples of healthy fats  · Eat foods with omega-3 fats  Examples include salmon, tuna, mackerel, walnuts, and flaxseed  Eat fish 2 times per week  Pregnant women should not eat fish that have high levels of mercury, such as shark, swordfish, and pearl mackerel  · Increase the amount of high-fiber foods you eat  High-fiber foods can help lower your LDL cholesterol  Aim to get between 20 and 30 grams of fiber each day  Fruits and vegetables are high in fiber  Eat at least 5 servings each day  Other high-fiber foods are whole-grain or whole-wheat breads, pastas, or cereals, and brown rice  Eat 3 ounces of whole-grain foods each day  Increase fiber slowly  You may have abdominal discomfort, bloating, and gas if you add fiber to your diet too quickly  · Eat healthy protein foods  Examples include low-fat dairy products, skinless chicken and turkey, fish, and nuts      · Limit foods and drinks that are high in sugar  Your dietitian or healthcare provider can help you create daily limits for high-sugar foods and drinks  The limit may be lower if you have diabetes or another health condition  Limits can also help you lose weight if needed  Lifestyle changes you can make to lower your cholesterol levels:   · Maintain a healthy weight  Ask your healthcare provider what a healthy weight is for you  Ask him or her to help you create a weight loss plan if needed  Weight loss can decrease your total cholesterol and triglyceride levels  Weight loss may also help keep your blood pressure at a healthy level  · Be physically active throughout the day  Physical activity, such as exercise, can help lower your total cholesterol level and maintain a healthy weight  Physical activity can also help increase your HDL cholesterol level  Work with your healthcare provider to create an program that is right for you  Get at least 30 to 40 minutes of moderate physical activity most days of the week  Examples of exercise include brisk walking, swimming, or biking  Also include strength training at least 2 times each week  Your healthcare providers can help you create a physical activity plan  · Do not smoke  Nicotine and other chemicals in cigarettes and cigars can raise your cholesterol levels  Ask your healthcare provider for information if you currently smoke and need help to quit  E-cigarettes or smokeless tobacco still contain nicotine  Talk to your healthcare provider before you use these products  · Limit or do not drink alcohol  Alcohol can increase your triglyceride levels  Ask your healthcare provider before you drink alcohol  Ask how much is okay for you to drink in 24 hours or 1 week  Follow up with your doctor as directed:  Write down your questions so you remember to ask them during your visits    © Copyright [employer] 2021 Information is for End User's use only and may not be sold, redistributed or otherwise used for commercial purposes  All illustrations and images included in CareNotes® are the copyrighted property of [employer]  or Amber Youssef  The above information is an  only  It is not intended as medical advice for individual conditions or treatments  Talk to your doctor, nurse or pharmacist before following any medical regimen to see if it is safe and effective for you

## 2021-09-24 ENCOUNTER — HOSPITAL ENCOUNTER (INPATIENT)
Facility: HOSPITAL | Age: 36
LOS: 1 days | Discharge: HOME | DRG: 603 | End: 2021-09-25
Attending: EMERGENCY MEDICINE | Admitting: INTERNAL MEDICINE
Payer: COMMERCIAL

## 2021-09-24 ENCOUNTER — APPOINTMENT (EMERGENCY)
Dept: RADIOLOGY | Facility: HOSPITAL | Age: 36
DRG: 603 | End: 2021-09-24
Payer: COMMERCIAL

## 2021-09-24 ENCOUNTER — OFFICE VISIT (OUTPATIENT)
Dept: URGENT CARE | Facility: CLINIC | Age: 36
End: 2021-09-24
Payer: COMMERCIAL

## 2021-09-24 VITALS
HEART RATE: 72 BPM | WEIGHT: 210 LBS | DIASTOLIC BLOOD PRESSURE: 84 MMHG | OXYGEN SATURATION: 100 % | RESPIRATION RATE: 18 BRPM | TEMPERATURE: 100.5 F | BODY MASS INDEX: 28.44 KG/M2 | SYSTOLIC BLOOD PRESSURE: 141 MMHG | HEIGHT: 72 IN

## 2021-09-24 DIAGNOSIS — L03.114 CELLULITIS OF LEFT ELBOW: Primary | ICD-10-CM

## 2021-09-24 DIAGNOSIS — L03.114 CELLULITIS OF LEFT ARM: Primary | ICD-10-CM

## 2021-09-24 DIAGNOSIS — M00.9 SEPTIC JOINT (HCC): ICD-10-CM

## 2021-09-24 LAB
ALBUMIN SERPL BCP-MCNC: 4.7 G/DL (ref 3.5–5.7)
ALP SERPL-CCNC: 74 U/L (ref 40–150)
ALT SERPL W P-5'-P-CCNC: 14 U/L (ref 7–52)
ANION GAP SERPL CALCULATED.3IONS-SCNC: 9 MMOL/L (ref 4–13)
APTT PPP: 33 SECONDS (ref 23–37)
AST SERPL W P-5'-P-CCNC: 13 U/L (ref 13–39)
BASOPHILS # BLD AUTO: 0 THOUSANDS/ΜL (ref 0–0.1)
BASOPHILS NFR BLD AUTO: 0 % (ref 0–2)
BILIRUB SERPL-MCNC: 0.4 MG/DL (ref 0.2–1)
BUN SERPL-MCNC: 16 MG/DL (ref 7–25)
CALCIUM SERPL-MCNC: 9.7 MG/DL (ref 8.6–10.5)
CHLORIDE SERPL-SCNC: 102 MMOL/L (ref 98–107)
CO2 SERPL-SCNC: 27 MMOL/L (ref 21–31)
CREAT SERPL-MCNC: 0.95 MG/DL (ref 0.7–1.3)
EOSINOPHIL # BLD AUTO: 0.1 THOUSAND/ΜL (ref 0–0.61)
EOSINOPHIL NFR BLD AUTO: 1 % (ref 0–5)
ERYTHROCYTE [DISTWIDTH] IN BLOOD BY AUTOMATED COUNT: 12.8 % (ref 11.5–14.5)
GFR SERPL CREATININE-BSD FRML MDRD: 104 ML/MIN/1.73SQ M
GLUCOSE SERPL-MCNC: 102 MG/DL (ref 65–99)
HCT VFR BLD AUTO: 42.8 % (ref 42–47)
HGB BLD-MCNC: 14.9 G/DL (ref 14–18)
INR PPP: 0.93 (ref 0.84–1.19)
LACTATE SERPL-SCNC: 0.8 MMOL/L (ref 0.5–2)
LYMPHOCYTES # BLD AUTO: 0.9 THOUSANDS/ΜL (ref 0.6–4.47)
LYMPHOCYTES NFR BLD AUTO: 9 % (ref 21–51)
MCH RBC QN AUTO: 29 PG (ref 26–34)
MCHC RBC AUTO-ENTMCNC: 34.8 G/DL (ref 31–37)
MCV RBC AUTO: 83 FL (ref 81–99)
MONOCYTES # BLD AUTO: 0.5 THOUSAND/ΜL (ref 0.17–1.22)
MONOCYTES NFR BLD AUTO: 5 % (ref 2–12)
NEUTROPHILS # BLD AUTO: 8.6 THOUSANDS/ΜL (ref 1.4–6.5)
NEUTS SEG NFR BLD AUTO: 84 % (ref 42–75)
PLATELET # BLD AUTO: 197 THOUSANDS/UL (ref 149–390)
PMV BLD AUTO: 8.1 FL (ref 8.6–11.7)
POTASSIUM SERPL-SCNC: 3.8 MMOL/L (ref 3.5–5.5)
PROT SERPL-MCNC: 7.9 G/DL (ref 6.4–8.9)
PROTHROMBIN TIME: 12.6 SECONDS (ref 11.6–14.5)
RBC # BLD AUTO: 5.13 MILLION/UL (ref 4.3–5.9)
SODIUM SERPL-SCNC: 138 MMOL/L (ref 134–143)
TROPONIN I SERPL-MCNC: <0.03 NG/ML
WBC # BLD AUTO: 10.1 THOUSAND/UL (ref 4.8–10.8)

## 2021-09-24 PROCEDURE — 85025 COMPLETE CBC W/AUTO DIFF WBC: CPT | Performed by: EMERGENCY MEDICINE

## 2021-09-24 PROCEDURE — 99285 EMERGENCY DEPT VISIT HI MDM: CPT

## 2021-09-24 PROCEDURE — 99213 OFFICE O/P EST LOW 20 MIN: CPT | Performed by: PHYSICIAN ASSISTANT

## 2021-09-24 PROCEDURE — 85610 PROTHROMBIN TIME: CPT | Performed by: EMERGENCY MEDICINE

## 2021-09-24 PROCEDURE — 85730 THROMBOPLASTIN TIME PARTIAL: CPT | Performed by: EMERGENCY MEDICINE

## 2021-09-24 PROCEDURE — 36415 COLL VENOUS BLD VENIPUNCTURE: CPT | Performed by: EMERGENCY MEDICINE

## 2021-09-24 PROCEDURE — 84145 PROCALCITONIN (PCT): CPT | Performed by: EMERGENCY MEDICINE

## 2021-09-24 PROCEDURE — 99223 1ST HOSP IP/OBS HIGH 75: CPT | Performed by: PHYSICIAN ASSISTANT

## 2021-09-24 PROCEDURE — 83605 ASSAY OF LACTIC ACID: CPT | Performed by: EMERGENCY MEDICINE

## 2021-09-24 PROCEDURE — 96367 TX/PROPH/DG ADDL SEQ IV INF: CPT

## 2021-09-24 PROCEDURE — 93005 ELECTROCARDIOGRAM TRACING: CPT

## 2021-09-24 PROCEDURE — 96365 THER/PROPH/DIAG IV INF INIT: CPT

## 2021-09-24 PROCEDURE — 99285 EMERGENCY DEPT VISIT HI MDM: CPT | Performed by: EMERGENCY MEDICINE

## 2021-09-24 PROCEDURE — 73080 X-RAY EXAM OF ELBOW: CPT

## 2021-09-24 PROCEDURE — 84484 ASSAY OF TROPONIN QUANT: CPT | Performed by: EMERGENCY MEDICINE

## 2021-09-24 PROCEDURE — 80053 COMPREHEN METABOLIC PANEL: CPT | Performed by: EMERGENCY MEDICINE

## 2021-09-24 PROCEDURE — 87040 BLOOD CULTURE FOR BACTERIA: CPT | Performed by: EMERGENCY MEDICINE

## 2021-09-24 RX ORDER — ONDANSETRON 2 MG/ML
4 INJECTION INTRAMUSCULAR; INTRAVENOUS EVERY 6 HOURS PRN
Status: DISCONTINUED | OUTPATIENT
Start: 2021-09-24 | End: 2021-09-25 | Stop reason: HOSPADM

## 2021-09-24 RX ORDER — KETOROLAC TROMETHAMINE 30 MG/ML
15 INJECTION, SOLUTION INTRAMUSCULAR; INTRAVENOUS ONCE
Status: DISCONTINUED | OUTPATIENT
Start: 2021-09-24 | End: 2021-09-24

## 2021-09-24 RX ORDER — CEFEPIME HYDROCHLORIDE 2 G/50ML
2000 INJECTION, SOLUTION INTRAVENOUS EVERY 12 HOURS
Status: DISCONTINUED | OUTPATIENT
Start: 2021-09-25 | End: 2021-09-25 | Stop reason: HOSPADM

## 2021-09-24 RX ORDER — VENLAFAXINE HYDROCHLORIDE 37.5 MG/1
37.5 CAPSULE, EXTENDED RELEASE ORAL DAILY
COMMUNITY

## 2021-09-24 RX ORDER — SODIUM CHLORIDE 9 MG/ML
125 INJECTION, SOLUTION INTRAVENOUS CONTINUOUS
Status: DISCONTINUED | OUTPATIENT
Start: 2021-09-24 | End: 2021-09-25

## 2021-09-24 RX ORDER — CEFEPIME HYDROCHLORIDE 2 G/50ML
2000 INJECTION, SOLUTION INTRAVENOUS ONCE
Status: COMPLETED | OUTPATIENT
Start: 2021-09-24 | End: 2021-09-24

## 2021-09-24 RX ORDER — KETOROLAC TROMETHAMINE 30 MG/ML
30 INJECTION, SOLUTION INTRAMUSCULAR; INTRAVENOUS EVERY 6 HOURS SCHEDULED
Status: DISCONTINUED | OUTPATIENT
Start: 2021-09-25 | End: 2021-09-25 | Stop reason: HOSPADM

## 2021-09-24 RX ORDER — ACETAMINOPHEN 325 MG/1
650 TABLET ORAL EVERY 4 HOURS PRN
Status: DISCONTINUED | OUTPATIENT
Start: 2021-09-24 | End: 2021-09-25 | Stop reason: HOSPADM

## 2021-09-24 RX ADMIN — CEFEPIME HYDROCHLORIDE 2000 MG: 2 INJECTION, SOLUTION INTRAVENOUS at 22:06

## 2021-09-24 RX ADMIN — SODIUM CHLORIDE 1000 ML: 0.9 INJECTION, SOLUTION INTRAVENOUS at 22:05

## 2021-09-24 RX ADMIN — VANCOMYCIN HYDROCHLORIDE 1750 MG: 1 INJECTION, POWDER, LYOPHILIZED, FOR SOLUTION INTRAVENOUS at 22:45

## 2021-09-24 RX ADMIN — KETOROLAC TROMETHAMINE 15 MG: 30 INJECTION, SOLUTION INTRAMUSCULAR; INTRAVENOUS at 23:34

## 2021-09-24 NOTE — Clinical Note
Duy Wright was seen and treated in our emergency department on 9/24/2021  Diagnosis:     Ashley Becerra  may return to school on return date  He may return on this date: 09/25/2021         If you have any questions or concerns, please don't hesitate to call        Niels Hinds RN    ______________________________           _______________          _______________  Hospital Representative                              Date                                Time

## 2021-09-24 NOTE — Clinical Note
Kira Irasemaconrelio was seen and treated in our emergency department on 9/24/2021  Diagnosis:     Daksha Tavares  may return to school on return date  He may return on this date: 09/25/2021         If you have any questions or concerns, please don't hesitate to call        Jarrett Bryant RN    ______________________________           _______________          _______________  Hospital Representative                              Date                                Time

## 2021-09-25 ENCOUNTER — APPOINTMENT (INPATIENT)
Dept: CT IMAGING | Facility: HOSPITAL | Age: 36
DRG: 603 | End: 2021-09-25
Payer: COMMERCIAL

## 2021-09-25 VITALS
SYSTOLIC BLOOD PRESSURE: 126 MMHG | HEART RATE: 66 BPM | TEMPERATURE: 97.7 F | OXYGEN SATURATION: 97 % | HEIGHT: 72 IN | WEIGHT: 200 LBS | RESPIRATION RATE: 20 BRPM | BODY MASS INDEX: 27.09 KG/M2 | DIASTOLIC BLOOD PRESSURE: 74 MMHG

## 2021-09-25 PROBLEM — F17.220 CHEWING TOBACCO NICOTINE DEPENDENCE WITHOUT COMPLICATION: Status: ACTIVE | Noted: 2021-09-25

## 2021-09-25 LAB
ANION GAP SERPL CALCULATED.3IONS-SCNC: 4 MMOL/L (ref 4–13)
ATRIAL RATE: 74 BPM
BUN SERPL-MCNC: 12 MG/DL (ref 7–25)
CALCIUM SERPL-MCNC: 8.4 MG/DL (ref 8.6–10.5)
CHLORIDE SERPL-SCNC: 106 MMOL/L (ref 98–107)
CO2 SERPL-SCNC: 29 MMOL/L (ref 21–31)
CREAT SERPL-MCNC: 0.99 MG/DL (ref 0.7–1.3)
ERYTHROCYTE [DISTWIDTH] IN BLOOD BY AUTOMATED COUNT: 12.6 % (ref 11.5–14.5)
GFR SERPL CREATININE-BSD FRML MDRD: 99 ML/MIN/1.73SQ M
GLUCOSE SERPL-MCNC: 91 MG/DL (ref 65–99)
HCT VFR BLD AUTO: 39.7 % (ref 42–47)
HGB BLD-MCNC: 13.5 G/DL (ref 14–18)
MCH RBC QN AUTO: 28.5 PG (ref 26–34)
MCHC RBC AUTO-ENTMCNC: 33.9 G/DL (ref 31–37)
MCV RBC AUTO: 84 FL (ref 81–99)
P AXIS: 18 DEGREES
PLATELET # BLD AUTO: 174 THOUSANDS/UL (ref 149–390)
PMV BLD AUTO: 7.8 FL (ref 8.6–11.7)
POTASSIUM SERPL-SCNC: 4.1 MMOL/L (ref 3.5–5.5)
PR INTERVAL: 156 MS
PROCALCITONIN SERPL-MCNC: <0.05 NG/ML
QRS AXIS: 87 DEGREES
QRSD INTERVAL: 92 MS
QT INTERVAL: 408 MS
QTC INTERVAL: 452 MS
RBC # BLD AUTO: 4.73 MILLION/UL (ref 4.3–5.9)
SODIUM SERPL-SCNC: 139 MMOL/L (ref 134–143)
T WAVE AXIS: -14 DEGREES
VENTRICULAR RATE: 74 BPM
WBC # BLD AUTO: 8.1 THOUSAND/UL (ref 4.8–10.8)

## 2021-09-25 PROCEDURE — 99222 1ST HOSP IP/OBS MODERATE 55: CPT | Performed by: PHYSICIAN ASSISTANT

## 2021-09-25 PROCEDURE — 80048 BASIC METABOLIC PNL TOTAL CA: CPT | Performed by: PHYSICIAN ASSISTANT

## 2021-09-25 PROCEDURE — NC001 PR NO CHARGE: Performed by: STUDENT IN AN ORGANIZED HEALTH CARE EDUCATION/TRAINING PROGRAM

## 2021-09-25 PROCEDURE — 85027 COMPLETE CBC AUTOMATED: CPT | Performed by: PHYSICIAN ASSISTANT

## 2021-09-25 PROCEDURE — 99239 HOSP IP/OBS DSCHRG MGMT >30: CPT | Performed by: STUDENT IN AN ORGANIZED HEALTH CARE EDUCATION/TRAINING PROGRAM

## 2021-09-25 PROCEDURE — 93010 ELECTROCARDIOGRAM REPORT: CPT | Performed by: INTERNAL MEDICINE

## 2021-09-25 PROCEDURE — 73200 CT UPPER EXTREMITY W/O DYE: CPT

## 2021-09-25 PROCEDURE — 36415 COLL VENOUS BLD VENIPUNCTURE: CPT | Performed by: PHYSICIAN ASSISTANT

## 2021-09-25 RX ORDER — SULFAMETHOXAZOLE AND TRIMETHOPRIM 800; 160 MG/1; MG/1
1 TABLET ORAL EVERY 12 HOURS SCHEDULED
Qty: 14 TABLET | Refills: 0 | Status: SHIPPED | OUTPATIENT
Start: 2021-09-25 | End: 2021-09-25 | Stop reason: SDUPTHER

## 2021-09-25 RX ORDER — VENLAFAXINE HYDROCHLORIDE 75 MG/1
75 CAPSULE, EXTENDED RELEASE ORAL DAILY
Status: CANCELLED | OUTPATIENT
Start: 2021-09-25

## 2021-09-25 RX ORDER — SULFAMETHOXAZOLE AND TRIMETHOPRIM 800; 160 MG/1; MG/1
1 TABLET ORAL EVERY 12 HOURS SCHEDULED
Qty: 14 TABLET | Refills: 0 | Status: SHIPPED | OUTPATIENT
Start: 2021-09-25 | End: 2021-10-02

## 2021-09-25 RX ADMIN — KETOROLAC TROMETHAMINE 30 MG: 30 INJECTION, SOLUTION INTRAMUSCULAR; INTRAVENOUS at 07:35

## 2021-09-25 RX ADMIN — CEFEPIME HYDROCHLORIDE 2000 MG: 2 INJECTION, SOLUTION INTRAVENOUS at 09:20

## 2021-09-25 RX ADMIN — SODIUM CHLORIDE 125 ML/HR: 0.9 INJECTION, SOLUTION INTRAVENOUS at 00:11

## 2021-09-25 RX ADMIN — KETOROLAC TROMETHAMINE 30 MG: 30 INJECTION, SOLUTION INTRAMUSCULAR; INTRAVENOUS at 12:03

## 2021-09-25 RX ADMIN — SODIUM CHLORIDE 125 ML/HR: 0.9 INJECTION, SOLUTION INTRAVENOUS at 09:45

## 2021-09-25 RX ADMIN — VANCOMYCIN HYDROCHLORIDE 1250 MG: 1 INJECTION, POWDER, LYOPHILIZED, FOR SOLUTION INTRAVENOUS at 07:31

## 2021-09-25 NOTE — PROGRESS NOTES
[facility]  Progress Note Eri Argueta 1985, 31 y o  male MRN: [id number]  Unit/Bed#: ED 08 Encounter: [id number]  Primary Care Provider: Perla Canavan, CRNP   Date and time admitted to hospital: 2021  9:13 PM    Chewing tobacco nicotine dependence without complication  Assessment & Plan  · Chews 1 can every 3-4 days  · nicorette gum requested    Anxiety  Assessment & Plan  · effexor 75mg daily    * Left arm cellulitis  Assessment & Plan  · Patient reported rapid change in symptoms with she streaking redness up his arm  Was seen at urgent care and sent to the ER for further evaluation  · Ortho consult consulted appreciate recommendations  · Follow-up blood cultures and procalcitonin level  · cefepime and vancomycin will deescalate upon discharge  · Consult General surgery to determine if I&D is necessary        VTE Pharmacologic Prophylaxis: VTE Score: 2 Low Risk (Score 0-2) - Encourage Ambulation  Patient Centered Rounds: I performed bedside rounds with nursing staff today  Discussions with Specialists or Other Care Team Provider:  Orthopedics    Education and Discussions with Family / Patient: Patient declined call to   Time Spent for Care: 30 minutes  More than 50% of total time spent on counseling and coordination of care as described above  Current Length of Stay: 1 day(s)  Current Patient Status: Inpatient   Certification Statement: The patient will continue to require additional inpatient hospital stay due to Administration IV antibiotics  Discharge Plan: Anticipate discharge tomorrow to home  Code Status: Level 1 - Full Code    Subjective:   Patient seen in ED, resting comfortably in bed  Patient reports some pain with flexion/extension of left elbow  Denies any new symptoms at this time      Objective:     Vitals:   Temp (24hrs), Av 4 °F (37 4 °C), Min:97 7 °F (36 5 °C), Max:100 5 °F (38 1 °C)    Temp:  [97 7 °F (36 5 °C)-100 5 °F (38 1 °C)] 97 7 °F (36 5 °C)  HR:  [66-78] 66  Resp:  [18-20] 20  BP: (126-156)/(74-98) 126/74  SpO2:  [97 %-100 %] 97 %  Body mass index is 27 12 kg/m²  Input and Output Summary (last 24 hours): Intake/Output Summary (Last 24 hours) at 9/25/2021 1216  Last data filed at 9/25/2021 0221  Gross per 24 hour   Intake 1695 83 ml   Output --   Net 1695 83 ml       Physical Exam:   Physical Exam  Constitutional:       Appearance: Normal appearance  He is normal weight  HENT:      Head: Normocephalic  Cardiovascular:      Rate and Rhythm: Normal rate and regular rhythm  Pulses: Normal pulses  Heart sounds: Normal heart sounds  Pulmonary:      Effort: Pulmonary effort is normal       Breath sounds: Normal breath sounds  Abdominal:      General: Abdomen is flat  Bowel sounds are normal       Palpations: Abdomen is soft  Musculoskeletal:        Arms:    Skin:     Findings: Erythema present  Neurological:      General: No focal deficit present  Mental Status: He is alert and oriented to person, place, and time  Mental status is at baseline  Psychiatric:         Mood and Affect: Mood normal          Behavior: Behavior normal          Thought Content:  Thought content normal          Judgment: Judgment normal           Additional Data:     Labs:  Results from last 7 days   Lab Units 09/25/21  0728 09/24/21  2153   WBC Thousand/uL 8 10 10 10   HEMOGLOBIN g/dL 13 5* 14 9   HEMATOCRIT % 39 7* 42 8   PLATELETS Thousands/uL 174 197   NEUTROS PCT %  --  84*   LYMPHS PCT %  --  9*   MONOS PCT %  --  5   EOS PCT %  --  1     Results from last 7 days   Lab Units 09/25/21  0728 09/24/21  2153   SODIUM mmol/L 139 138   POTASSIUM mmol/L 4 1 3 8   CHLORIDE mmol/L 106 102   CO2 mmol/L 29 27   BUN mg/dL 12 16   CREATININE mg/dL 0 99 0 95   ANION GAP mmol/L 4 9   CALCIUM mg/dL 8 4* 9 7   ALBUMIN g/dL  --  4 7   TOTAL BILIRUBIN mg/dL  --  0 40   ALK PHOS U/L  --  74   ALT U/L  --  14   AST U/L  -- 13   GLUCOSE RANDOM mg/dL 91 102*     Results from last 7 days   Lab Units 09/24/21  2153   INR  0 93             Results from last 7 days   Lab Units 09/24/21  2153   LACTIC ACID mmol/L 0 8       Lines/Drains:  Invasive Devices     Peripheral Intravenous Line            Peripheral IV 09/24/21 Right Antecubital <1 day    Peripheral IV 09/24/21 Right Antecubital <1 day                      Imaging: Reviewed radiology reports from this admission including: chest xray    Recent Cultures (last 7 days):         Last 24 Hours Medication List:   Current Facility-Administered Medications   Medication Dose Route Frequency Provider Last Rate    acetaminophen  650 mg Oral Q4H PRN Jonas Galindo PA-C      cefepime  2,000 mg Intravenous Q12H Jonas Galindo PA-C 2,000 mg (09/25/21 0920)    ketorolac  30 mg Intravenous Q6H [address] [facility] [place]      nicotine polacrilex  2 mg Oral Q2H PRN Jonas Galindo PA-C      ondansetron  4 mg Intravenous Q6H PRN Jonas Galindo PA-C      vancomycin  15 mg/kg Intravenous [address] [place], [place] Stopped (09/25/21 6082)        Today, Patient Was Seen By: Micki Godwin DO    **Please Note: This note may have been constructed using a voice recognition system  **

## 2021-09-25 NOTE — PROGRESS NOTES
[facility]        NAME: Antonia Lopez is a 31 y o  male  : 1985    MRN: [id number]  DATE: 2021  TIME: 8:40 PM    Assessment and Plan   Cellulitis of left elbow [L03 114]  1  Cellulitis of left elbow  Transfer to other facility         Patient Instructions   Patient Instructions     Cellulitis   WHAT YOU NEED TO KNOW:   Cellulitis is a skin infection caused by bacteria  Cellulitis is common and can become severe  Cellulitis usually appears on the lower legs  It can also appear on the arms, face, and other areas  Cellulitis develops when bacteria enter a crack or break in your skin, such as a scratch, bite, or cut  DISCHARGE INSTRUCTIONS:   Return to the emergency department if:   · Your wound gets larger and more painful  · You feel a crackling under your skin when you touch it  · You have purple dots or bumps on your skin, or you see bleeding under your skin  · You see red streaks coming from the infected area  Call your doctor if:   · The red, warm, swollen area gets larger  · Your fever or pain does not go away or gets worse  · The area does not get smaller after 3 days of antibiotics  · You have questions or concerns about your condition or care  Medicines: You should start to see improvement in 3 days  If cellulitis is not treated, the infection can spread through your body and become life-threatening  You may need any of the following medicines:  · Antibiotics  help treat the bacterial infection  · Acetaminophen  decreases pain and fever  It is available without a doctor's order  Ask how much to take and how often to take it  Follow directions  Read the labels of all other medicines you are using to see if they also contain acetaminophen, or ask your doctor or pharmacist  Acetaminophen can cause liver damage if not taken correctly  Do not use more than 4 grams (4,000 milligrams) total of acetaminophen in one day       · NSAIDs , such as ibuprofen, help decrease swelling, pain, and fever  This medicine is available with or without a doctor's order  NSAIDs can cause stomach bleeding or kidney problems in certain people  If you take blood thinner medicine, always ask your healthcare provider if NSAIDs are safe for you  Always read the medicine label and follow directions  · Take your medicine as directed  Contact your healthcare provider if you think your medicine is not helping or if you have side effects  Tell him or her if you are allergic to any medicine  Keep a list of the medicines, vitamins, and herbs you take  Include the amounts, and when and why you take them  Bring the list or the pill bottles to follow-up visits  Carry your medicine list with you in case of an emergency  Self-care:   · Wash the area with soap and water every day  Gently pat dry  Use bandages if directed by your healthcare provider  · Elevate the area above the level of your heart  as often as you can  This will help decrease swelling and pain  Prop the area on pillows or blankets to keep it elevated comfortably  · Place a cool, damp cloth on the area  Use clean cloths and clean water  You can do this as often as you need to  Cool, damp cloths may help decrease pain  · Apply cream or ointment as directed  These help protect the area  Most over-the-counter products, such as petroleum jelly, are good to use  Ask your healthcare provider about specific creams or ointments you should use  Prevent cellulitis:   · Do not scratch bug bites or areas of injury  You increase your risk for cellulitis by scratching these areas  · Do not share personal items, such as towels, clothing, and razors  · Clean exercise equipment  with germ-killing  before and after you use it  · Treat athlete's foot  This can help prevent the spread of a bacterial skin infection  · Wash your hands often  Use soap and water   Wash your hands after you use the bathroom, change a child's diapers, or sneeze  Wash your hands before you prepare or eat food  Use lotion to prevent dry, cracked skin  Follow up with your doctor within 3 days, or as directed:  He or she will check if your cellulitis is getting better  Write down your questions so you remember to ask them during your visits  © Copyright [address] 2021 Information is for End User's use only and may not be sold, redistributed or otherwise used for commercial purposes  All illustrations and images included in CareNotes® are the copyrighted property of A [employer] Inc  or [employer]   The above information is an  only  It is not intended as medical advice for individual conditions or treatments  Talk to your doctor, nurse or pharmacist before following any medical regimen to see if it is safe and effective for you  Follow up with PCP in 3-5 days  Proceed to  ER if symptoms worsen  Chief Complaint     Chief Complaint   Patient presents with    Wound Infection     left elbow x 1 week         History of Present Illness       Patient is a 31 year male who presents to the clinic complaining of pain, redness, and swelling of the left elbow  The patient states the symptoms started on Tuesday with a blister on the left elbow  He states that he had surrounding erythema and redness over the last few days  The patient states the area formed a black area in the middle and he now has extreme swelling in streaking in his left upper arm  The patient states he started with fevers and chills and generalized weakness today  He denies any specific injury or bite to the area  Patient also denies nausea, vomiting, or diarrhea  Review of Systems   Review of Systems   Constitutional: Positive for chills and fever  Musculoskeletal: Positive for arthralgias and joint swelling  Skin: Positive for color change and wound           Current Medications       Current Outpatient Medications:     sertraline (ZOLOFT) 25 mg tablet, Take 25 mg by mouth daily , Disp: , Rfl:     Current Allergies     Allergies as of 09/24/2021    (No Known Allergies)            The following portions of the patient's history were reviewed and updated as appropriate: allergies, current medications, past family history, past medical history, past social history, past surgical history and problem list      Past Medical History:   Diagnosis Date    Asthma        Past Surgical History:   Procedure Laterality Date    BONE RESECTION, HUMERUS Right     EYE SURGERY Bilateral     TONSILECTOMY AND ADNOIDECTOMY         Family History   Problem Relation Age of Onset    Prostate cancer Father          Medications have been verified  Objective   /84   Pulse 72   Temp 100 5 °F (38 1 °C)   Resp 18   Ht 6' (1 829 m)   Wt 95 3 kg (210 lb)   SpO2 100%   BMI 28 48 kg/m²        Physical Exam     Physical Exam  Constitutional:       Appearance: Normal appearance  He is ill-appearing  Musculoskeletal:        Arms:       Comments: The patient has an eschar noted in the medial aspect of the left elbow with surrounding erythema and significant edema  There is edema around the entire elbow with a streak in the left inner arm  The patient has S1 left axillary lymph node  He does have pain with flexion and extension of the left elbow  There is no pain with rotation of the left elbow  Lymphadenopathy:      Upper Body:      Left upper body: Axillary adenopathy present  Neurological:      Mental Status: He is alert          -the patient was sent to the ER for further evaluation as he will likely need IV antibiotics and stat labs for his symptoms

## 2021-09-25 NOTE — PROGRESS NOTES
Vancomycin Assessment    Dominique Menchaca is a 31 y o  male who is currently receiving vancomycin 1750 mg iv once (given) then 1750 mg iv q 24 hours for skin-soft tissue infection, other, MRSA suspected bone/joint   Relevant clinical data and objective history reviewed:  Creatinine   Date Value Ref Range Status   09/24/2021 0 95 0 70 - 1 30 mg/dL Final     Comment:     Standardized to IDMS reference method   07/20/2021 1 06 0 60 - 1 30 mg/dL Final     Comment:     Standardized to IDMS reference method     /98 (BP Location: Right arm)   Pulse 78   Temp 100 1 °F (37 8 °C) (Temporal)   Resp 20   Ht 6' (1 829 m)   Wt 90 7 kg (200 lb)   SpO2 98%   BMI 27 12 kg/m²   No intake/output data recorded  Lab Results   Component Value Date/Time    BUN 16 09/24/2021 09:53 PM    WBC 10 10 09/24/2021 09:53 PM    HGB 14 9 09/24/2021 09:53 PM    HCT 42 8 09/24/2021 09:53 PM    MCV 83 09/24/2021 09:53 PM     09/24/2021 09:53 PM     Temp Readings from Last 3 Encounters:   09/24/21 100 1 °F (37 8 °C) (Temporal)   09/24/21 100 5 °F (38 1 °C)   07/29/21 (!) 96 7 °F (35 9 °C) (Tympanic)     Vancomycin Days of Therapy: 1    Assessment/Plan  The patient is currently on vancomycin utilizing scheduled dosing based on actual body weight  Baseline risks associated with therapy include: concomitant nephrotoxic medications and dehydration  The patient is currently receiving 1750 mg iv once (given) then 1750 mg iv q 24 hours and after clinical evaluation will be changed to 1250 mg iv q 8 hours  Pharmacy will also follow closely for s/sx of nephrotoxicity, infusion reactions, and appropriateness of therapy  BMP and CBC will be ordered per protocol  Plan for trough as patient approaches steady state, prior to the 4th  dose at approximately 22:30 on 9/25/2021  Due to infection severity, will target a trough of 15-20 (appropriate for most indications)     Pharmacy will continue to follow the patients culture results and clinical progress daily      Susan Bennett, Pharmacist

## 2021-09-25 NOTE — ED PROVIDER NOTES
History  Chief Complaint   Patient presents with    Cellulitis     Sunday bump on L elbow, now open and oozing  Warm, sensitive, red line up the arm  59-year-old male presenting for worsening cellulitis of his L elbow with associated chills, headache seen at urgent care and sent here as he was febrile and with streaking up his arm from the area  He states that he first noticed it about 2 days ago and it has significantly worsened since then and the streaking started today also states he noticed the initial wound after doing training for a marathon and jumping into a river to swim  Denies CP, SOB, abdominal pain, vomiting, diarrhea, constipation, dysuria, hematuria  Prior to Admission Medications   Prescriptions Last Dose Informant Patient Reported? Taking?   venlafaxine (EFFEXOR-XR) 37 5 mg 24 hr capsule   Yes Yes   Sig: Take 37 5 mg by mouth daily Two pills daily      Facility-Administered Medications: None       Past Medical History:   Diagnosis Date    Asthma        Past Surgical History:   Procedure Laterality Date    BONE RESECTION, HUMERUS Right     EYE SURGERY Bilateral     TONSILECTOMY AND ADNOIDECTOMY         Family History   Problem Relation Age of Onset    Prostate cancer Father     Cancer Father         prostate     I have reviewed and agree with the history as documented  E-Cigarette/Vaping    E-Cigarette Use Never User      E-Cigarette/Vaping Substances    Nicotine No     THC No     CBD No     Flavoring No     Other No     Unknown No      Social History     Tobacco Use    Smoking status: Never Smoker    Smokeless tobacco: Current User     Types: Chew   Vaping Use    Vaping Use: Never used   Substance Use Topics    Alcohol use: Yes     Comment: 6 pk week     Drug use: Not Currently       Review of Systems   Constitutional: Positive for chills and fever  Gastrointestinal: Positive for nausea  Negative for vomiting     Musculoskeletal: Positive for arthralgias (L elbow)  Skin: Positive for color change and wound  All other systems reviewed and are negative  Physical Exam  Physical Exam  Vitals and nursing note reviewed  Constitutional:       General: He is not in acute distress  Appearance: He is well-developed  He is not diaphoretic  HENT:      Head: Normocephalic and atraumatic  Right Ear: External ear normal       Left Ear: External ear normal       Nose: Nose normal    Eyes:      General: No scleral icterus  Right eye: No discharge  Left eye: No discharge  Conjunctiva/sclera: Conjunctivae normal    Cardiovascular:      Rate and Rhythm: Normal rate and regular rhythm  Heart sounds: Normal heart sounds  No murmur heard  No friction rub  No gallop  Pulmonary:      Effort: Pulmonary effort is normal  No respiratory distress  Breath sounds: Normal breath sounds  No wheezing or rales  Abdominal:      General: Bowel sounds are normal  There is no distension  Palpations: Abdomen is soft  There is no mass  Tenderness: There is no abdominal tenderness  There is no guarding  Musculoskeletal:         General: Swelling and tenderness present  No deformity  Normal range of motion  Right elbow: Normal       Left elbow: Swelling present  No deformity, effusion or lacerations  Normal range of motion  Tenderness present  Cervical back: Normal range of motion and neck supple  Comments: Approximately 3 cm in diameter of erythema and induration on dorsal aspect of L elbow  Erythema tracking up medial aspect of L forearm    Full rom of L elbow without pain   Skin:     General: Skin is warm and dry  Coloration: Skin is not pale  Findings: Erythema present  No rash  Neurological:      Mental Status: He is alert and oriented to person, place, and time  Psychiatric:         Behavior: Behavior normal          Thought Content:  Thought content normal          Judgment: Judgment normal          Vital Signs  ED Triage Vitals [09/24/21 2116]   Temperature Pulse Respirations Blood Pressure SpO2   100 1 °F (37 8 °C) 78 20 156/98 98 %      Temp Source Heart Rate Source Patient Position - Orthostatic VS BP Location FiO2 (%)   Temporal Monitor Sitting Right arm --      Pain Score       4           Vitals:    09/24/21 2116   BP: 156/98   Pulse: 78   Patient Position - Orthostatic VS: Sitting         Visual Acuity      ED Medications  Medications   acetaminophen (TYLENOL) tablet 650 mg (has no administration in time range)   ondansetron (ZOFRAN) injection 4 mg (has no administration in time range)   sodium chloride 0 9 % infusion (125 mL/hr Intravenous New Bag 9/25/21 0011)   ketorolac (TORADOL) injection 30 mg (30 mg Intravenous Not Given 9/25/21 0011)   cefepime (MAXIPIME) IVPB (premix in dextrose) 2,000 mg 50 mL (has no administration in time range)   vancomycin (VANCOCIN) 1,250 mg in sodium chloride 0 9 % 250 mL IVPB (has no administration in time range)   nicotine polacrilex (NICORETTE) gum 2 mg (has no administration in time range)   sodium chloride 0 9 % bolus 1,000 mL (1,000 mL Intravenous New Bag 9/24/21 2205)   vancomycin (VANCOCIN) 1,750 mg in sodium chloride 0 9 % 500 mL IVPB (1,750 mg Intravenous New Bag 9/24/21 2245)   cefepime (MAXIPIME) IVPB (premix in dextrose) 2,000 mg 50 mL (0 mg Intravenous Stopped 9/24/21 2245)       Diagnostic Studies  Results Reviewed     Procedure Component Value Units Date/Time    Comprehensive metabolic panel [[id number]]  (Abnormal) Collected: 09/24/21 2153    Lab Status: Final result Specimen: Blood from Arm, Right Updated: 09/24/21 2236     Sodium 138 mmol/L      Potassium 3 8 mmol/L      Chloride 102 mmol/L      CO2 27 mmol/L      ANION GAP 9 mmol/L      BUN 16 mg/dL      Creatinine 0 95 mg/dL      Glucose 102 mg/dL      Calcium 9 7 mg/dL      AST 13 U/L      ALT 14 U/L      Alkaline Phosphatase 74 U/L      Total Protein 7 9 g/dL      Albumin 4 7 g/dL      Total Bilirubin 0 40 mg/dL      eGFR 104 ml/min/1 73sq m     Narrative:      [place] guidelines for Chronic Kidney Disease (CKD):     Stage 1 with normal or high GFR (GFR > 90 mL/min/1 73 square meters)    Stage 2 Mild CKD (GFR = 60-89 mL/min/1 73 square meters)    Stage 3A Moderate CKD (GFR = 45-59 mL/min/1 73 square meters)    Stage 3B Moderate CKD (GFR = 30-44 mL/min/1 73 square meters)    Stage 4 Severe CKD (GFR = 15-29 mL/min/1 73 square meters)    Stage 5 End Stage CKD (GFR <15 mL/min/1 73 square meters)  Note: GFR calculation is accurate only with a steady state creatinine    Lactic acid [[id number]]  (Normal) Collected: 09/24/21 2153    Lab Status: Final result Specimen: Blood from Arm, Right Updated: 09/24/21 2235     LACTIC ACID 0 8 mmol/L     Narrative:      Result may be elevated if tourniquet was used during collection      Troponin I [[id number]]  (Normal) Collected: 09/24/21 2153    Lab Status: Final result Specimen: Blood from Arm, Right Updated: 09/24/21 2234     Troponin I <0 03 ng/mL     Protime-INR [[id number]]  (Normal) Collected: 09/24/21 2153    Lab Status: Final result Specimen: Blood from Arm, Right Updated: 09/24/21 2228     Protime 12 6 seconds      INR 0 93    APTT [[id number]]  (Normal) Collected: 09/24/21 2153    Lab Status: Final result Specimen: Blood from Arm, Right Updated: 09/24/21 2228     PTT 33 seconds     CBC and differential [[id number]]  (Abnormal) Collected: 09/24/21 2153    Lab Status: Final result Specimen: Blood from Arm, Right Updated: 09/24/21 2215     WBC 10 10 Thousand/uL      RBC 5 13 Million/uL      Hemoglobin 14 9 g/dL      Hematocrit 42 8 %      MCV 83 fL      MCH 29 0 pg      MCHC 34 8 g/dL      RDW 12 8 %      MPV 8 1 fL      Platelets 066 Thousands/uL      Neutrophils Relative 84 %      Lymphocytes Relative 9 %      Monocytes Relative 5 %      Eosinophils Relative 1 %      Basophils Relative 0 %      Neutrophils Absolute 8 60 Thousands/µL Lymphocytes Absolute 0 90 Thousands/µL      Monocytes Absolute 0 50 Thousand/µL      Eosinophils Absolute 0 10 Thousand/µL      Basophils Absolute 0 00 Thousands/µL     Procalcitonin with AM Reflex [[id number]] Collected: 09/24/21 2153    Lab Status: In process Specimen: Blood from Arm, Right Updated: 09/24/21 2210    Blood culture #2 [[id number]] Collected: 09/24/21 2153    Lab Status: In process Specimen: Blood from Arm, Right Updated: 09/24/21 2210    Blood culture #1 [[id number]] Collected: 09/24/21 2201    Lab Status: In process Specimen: Blood from Hand, Left Updated: 09/24/21 2209                 XR elbow 3+ vw LEFT    (Results Pending)              Procedures  ECG 12 Lead Documentation Only    Date/Time: 9/24/2021 11:43 PM  Performed by: Donnie Leyva DO  Authorized by: Donnie Leyva DO     Patient location:  ED  Interpretation:     Interpretation: abnormal    Rate:     ECG rate:  74    ECG rate assessment: normal    Rhythm:     Rhythm: sinus rhythm    Ectopy:     Ectopy: none    QRS:     QRS axis:  Normal    QRS intervals:  Normal  Conduction:     Conduction: normal    ST segments:     ST segments:  Normal  T waves:     T waves: inverted      Inverted:  III and aVF             ED Course                                           MDM  Number of Diagnoses or Management Options  Cellulitis of left arm  Diagnosis management comments: Pt took motrin 4 hours prior to arrival with temp here of 100 1 and chills at home  Tracking up arm  Will start septic labs, elbow xray, start IV antibiotics            Disposition  Final diagnoses:   Cellulitis of left arm     Time reflects when diagnosis was documented in both MDM as applicable and the Disposition within this note     Time User Action Codes Description Comment    9/24/2021 11:06 PM Everardo Spann Add [K99 731] Cellulitis of left arm     9/24/2021 11:09 PM Aldo ROSS Add [M00 9] Septic joint [facility])       ED Disposition     ED Disposition Condition Date/Time Comment    Admit Stable Fri Sep 24, 2021 11:06 PM Case was discussed with Shlipi Story and the patient's admission status was agreed to be Admission Status: inpatient status to the service of Dr Shilpi Story  Follow-up Information    None         Patient's Medications   Discharge Prescriptions    No medications on file     No discharge procedures on file      PDMP Review     None          ED Provider  Electronically Signed by           Lisa Etienne DO  09/25/21 2830

## 2021-09-25 NOTE — DISCHARGE SUMMARY
[facility]  Discharge- Reji Isidro 1985, 31 y o  male MRN: [id number]  Unit/Bed#: ED 08 Encounter: [id number]  Primary Care Provider: JARETT Mejia   Date and time admitted to hospital: 9/24/2021  9:13 PM    * Left arm cellulitis  Assessment & Plan  · Patient reported rapid change in symptoms with she streaking redness up his arm  Was seen at urgent care and sent to the ER for further evaluation  · Ortho consult consulted appreciate recommendations  · Follow-up blood cultures and procalcitonin level  · CT with no evidence of abscess  · Will discharge her on Bactrim DS b i d  X7 days        Medical Problems     Resolved Problems  Date Reviewed: 9/25/2021    None              Discharging Physician / Practitioner: Kwan Harrison DO  PCP: Jeanine Mejia  Admission Date:   Admission Orders (From admission, onward)     Ordered        09/24/21 2305  Inpatient Admission  Once                   Discharge Date: 09/25/21    Consultations During Hospital Stay:  · Orthopedic surgery    Procedures Performed:   · None    Significant Findings / Test Results:   None    Incidental Findings:   None    Test Results Pending at Discharge (will require follow up): None     Outpatient Tests Requested:  None    Complications:  None    Reason for Admission:  Cellulitis    Hospital Course:   Reji Isidro is a 31 y o  male patient who originally presented to the hospital on 9/24/2021 due to cellulitis  Patient was treated with IV antibiotics and promptly improved  Initially some concern for possible septic joint however patient was seen and evaluated by Orthopedic surgery who deemed septic joint unlikely  CT imaging was not suggestive underlying abscess  Patient will be discharged home with short course of oral Bactrim      The patient, initially admitted to the hospital as inpatient, was discharged earlier than expected given the following: Kishan Castillo than expected clinical improvement  Please see above list of diagnoses and related plan for additional information  Condition at Discharge: good      Discussion with Family: Patient declined call to   Discharge instructions/Information to patient and family:   See after visit summary for information provided to patient and family  Provisions for Follow-Up Care:  See after visit summary for information related to follow-up care and any pertinent home health orders  Disposition:   Home    Planned Readmission: No     Discharge Statement:  I spent 45 minutes discharging the patient  This time was spent on the day of discharge  I had direct contact with the patient on the day of discharge  Greater than 50% of the total time was spent examining patient, answering all patient questions, arranging and discussing plan of care with patient as well as directly providing post-discharge instructions  Additional time then spent on discharge activities  Discharge Medications:  See after visit summary for reconciled discharge medications provided to patient and/or family        **Please Note: This note may have been constructed using a voice recognition system**

## 2021-09-25 NOTE — CONSULTS
Consultation - Diana Mckeon 31 y o  male MRN: [id number]  Unit/Bed#: ED 08 Encounter: [id number]      Assessment/Plan     Assessment:  Left elbow cellulitis  Plan:  No signs of septic joint  No surgical intervention required from orthopedic standpoint  Continue IV antibiotics  Warm moist compresses  Monitor blood cultures      History of Present Illness   Physician Requesting Consult: Eli Mendiola DO  Reason for Consult / Principal Problem:  Left upper extremity cellulitis  HPI: Duy Wright is a 31y o  year old male who presented to the emergency department yesterday complaining of elbow erythema with drainage  He states that he noticed a black dot on his elbow approximately 3 days ago  He then noticed red streaking which significantly continue to worsen  He states he was training for a marathon and was swimming in the [facility] prior to noticing the symptoms  Blood cultures have been taken and he has been placed on IV antibiotics  He still complains of left elbow pain with erythema and warmth  His elbow is draining currently  He denies any numbness or tingling  He denies any fever or chills  Inpatient consult to Orthopedic Surgery  Consult performed by: Fidelia Rodriguez PA-C  Consult ordered by: Biran Alexander PA-C          Review of Systems   Constitutional: Negative for chills, fever and unexpected weight change  HENT: Negative for nosebleeds and sore throat  Eyes: Negative for pain, redness and visual disturbance  Respiratory: Negative for cough, shortness of breath and wheezing  Cardiovascular: Negative for chest pain, palpitations and leg swelling  Gastrointestinal: Negative for abdominal pain, nausea and vomiting  Endocrine: Negative for polydipsia and polyuria  Genitourinary: Negative for dysuria and hematuria  Musculoskeletal: Positive for arthralgias  As noted in HPI   Skin: Positive for color change and wound  Negative for rash  Neurological: Negative for dizziness, numbness and headaches  Psychiatric/Behavioral: Negative for decreased concentration and suicidal ideas  The patient is not nervous/anxious  Historical Information   Past Medical History:   Diagnosis Date    Asthma      Past Surgical History:   Procedure Laterality Date    BONE RESECTION, HUMERUS Right     EYE SURGERY Bilateral     TONSILECTOMY AND ADNOIDECTOMY       Social History   Social History     Substance and Sexual Activity   Alcohol Use Yes    Comment: 6 pk week      Social History     Substance and Sexual Activity   Drug Use Not Currently     E-Cigarette/Vaping    E-Cigarette Use Never User      E-Cigarette/Vaping Substances    Nicotine No     THC No     CBD No     Flavoring No     Other No     Unknown No      Social History     Tobacco Use   Smoking Status Never Smoker   Smokeless Tobacco Current User    Types: Chew     Family History: non-contributory    Meds/Allergies   all current active meds have been reviewed  No Known Allergies    Objective   Vitals: Blood pressure 126/74, pulse 66, temperature 100 1 °F (37 8 °C), temperature source Temporal, resp  rate 20, height 6' (1 829 m), weight 90 7 kg (200 lb), SpO2 97 %  ,Body mass index is 27 12 kg/m²  Intake/Output Summary (Last 24 hours) at 9/25/2021 0949  Last data filed at 9/25/2021 0221  Gross per 24 hour   Intake 1695 83 ml   Output --   Net 1695 83 ml     I/O last 24 hours:   In: 1695 8 [IV Piggyback:1695 8]  Out: -     Invasive Devices     Peripheral Intravenous Line            Peripheral IV 09/24/21 Right Antecubital <1 day    Peripheral IV 09/24/21 Right Antecubital <1 day                Physical Exam  Ortho Exam  Left upper extremity is neurovascularly intact  Fingers are pink and mobile  Compartments are soft  No palpable abscess present  3 centimeter in diameter area with induration and erythema  Scant amounts of noble pus  No signs of streaking at this time  Warm to palpation  Brisk cap refill  Sensation intact  Good range of motion of elbow    Physical Exam   Constitutional: Appears well-developed and well-nourished  No distress  HENT:   Head: Normocephalic  Eyes: Conjunctivae are normal  Right eye exhibits no discharge  Left eye exhibits no discharge  No scleral icterus  Cardiovascular: Normal rate  Pulmonary/Chest: Effort normal    Neurological: Alert and oriented to person, place, and time  Skin: as listed above  Psychiatric: Normal mood and affect  Behavior is normal  Judgment and thought content normal      Lab Results: I have personally reviewed pertinent lab results  Imaging Studies: I have personally reviewed pertinent reports  EKG, Pathology, and Other Studies: I have personally reviewed pertinent reports  VTE Prophylaxis: Sequential compression device (Venodyne)     Code Status: Level 1 - Full Code  Advance Directive and Living Will:      Power of :    POLST:      Counseling / Coordination of Care  Total floor / unit time spent today 30 minutes  Greater than 50% of total time was spent with the patient and / or family counseling and / or coordination of care

## 2021-09-25 NOTE — ASSESSMENT & PLAN NOTE
· Patient reported rapid change in symptoms with she streaking redness up his arm    Was seen at urgent care and sent to the ER for further evaluation  · Ortho consult consulted appreciate recommendations  · Follow-up blood cultures and procalcitonin level  · cefepime and vancomycin will deescalate upon discharge  · Consult General surgery to determine if I&D is necessary

## 2021-09-25 NOTE — ASSESSMENT & PLAN NOTE
· Patient reported rapid change in symptoms with she streaking redness up his arm    Was seen at urgent care and sent to the ER for further evaluation  · Ortho consult  · Follow-up blood cultures and procalcitonin level  · Continue antibiotics started in the ER with cefepime and vancomycin

## 2021-09-25 NOTE — ED NOTES
Patient asleep with IV fluids infusing - left elbow elevated on pillow - patient had declined ice pack offered earlier by ALEX Maldonado  09/25/21 1921

## 2021-09-25 NOTE — ED NOTES
Requested urinal - the same provided - requested pain meds - the same to be administered     Merry Preciado RN  09/24/21 6911

## 2021-09-25 NOTE — ASSESSMENT & PLAN NOTE
· Patient reported rapid change in symptoms with she streaking redness up his arm    Was seen at urgent care and sent to the ER for further evaluation  · Ortho consult consulted appreciate recommendations  · Follow-up blood cultures and procalcitonin level  · CT with no evidence of abscess  · Will discharge her on Bactrim DS b i d  X7 days

## 2021-09-25 NOTE — PATIENT INSTRUCTIONS
Cellulitis   WHAT YOU NEED TO KNOW:   Cellulitis is a skin infection caused by bacteria  Cellulitis is common and can become severe  Cellulitis usually appears on the lower legs  It can also appear on the arms, face, and other areas  Cellulitis develops when bacteria enter a crack or break in your skin, such as a scratch, bite, or cut  DISCHARGE INSTRUCTIONS:   Return to the emergency department if:   · Your wound gets larger and more painful  · You feel a crackling under your skin when you touch it  · You have purple dots or bumps on your skin, or you see bleeding under your skin  · You see red streaks coming from the infected area  Call your doctor if:   · The red, warm, swollen area gets larger  · Your fever or pain does not go away or gets worse  · The area does not get smaller after 3 days of antibiotics  · You have questions or concerns about your condition or care  Medicines: You should start to see improvement in 3 days  If cellulitis is not treated, the infection can spread through your body and become life-threatening  You may need any of the following medicines:  · Antibiotics  help treat the bacterial infection  · Acetaminophen  decreases pain and fever  It is available without a doctor's order  Ask how much to take and how often to take it  Follow directions  Read the labels of all other medicines you are using to see if they also contain acetaminophen, or ask your doctor or pharmacist  Acetaminophen can cause liver damage if not taken correctly  Do not use more than 4 grams (4,000 milligrams) total of acetaminophen in one day  · NSAIDs , such as ibuprofen, help decrease swelling, pain, and fever  This medicine is available with or without a doctor's order  NSAIDs can cause stomach bleeding or kidney problems in certain people  If you take blood thinner medicine, always ask your healthcare provider if NSAIDs are safe for you   Always read the medicine label and follow directions  · Take your medicine as directed  Contact your healthcare provider if you think your medicine is not helping or if you have side effects  Tell him or her if you are allergic to any medicine  Keep a list of the medicines, vitamins, and herbs you take  Include the amounts, and when and why you take them  Bring the list or the pill bottles to follow-up visits  Carry your medicine list with you in case of an emergency  Self-care:   · Wash the area with soap and water every day  Gently pat dry  Use bandages if directed by your healthcare provider  · Elevate the area above the level of your heart  as often as you can  This will help decrease swelling and pain  Prop the area on pillows or blankets to keep it elevated comfortably  · Place a cool, damp cloth on the area  Use clean cloths and clean water  You can do this as often as you need to  Cool, damp cloths may help decrease pain  · Apply cream or ointment as directed  These help protect the area  Most over-the-counter products, such as petroleum jelly, are good to use  Ask your healthcare provider about specific creams or ointments you should use  Prevent cellulitis:   · Do not scratch bug bites or areas of injury  You increase your risk for cellulitis by scratching these areas  · Do not share personal items, such as towels, clothing, and razors  · Clean exercise equipment  with germ-killing  before and after you use it  · Treat athlete's foot  This can help prevent the spread of a bacterial skin infection  · Wash your hands often  Use soap and water  Wash your hands after you use the bathroom, change a child's diapers, or sneeze  Wash your hands before you prepare or eat food  Use lotion to prevent dry, cracked skin  Follow up with your doctor within 3 days, or as directed:  He or she will check if your cellulitis is getting better   Write down your questions so you remember to ask them during your visits  © Copyright [employer] 2021 Information is for End User's use only and may not be sold, redistributed or otherwise used for commercial purposes  All illustrations and images included in CareNotes® are the copyrighted property of A D A M , Inc  or Amber Youssef  The above information is an  only  It is not intended as medical advice for individual conditions or treatments  Talk to your doctor, nurse or pharmacist before following any medical regimen to see if it is safe and effective for you

## 2021-09-25 NOTE — DISCHARGE INSTRUCTIONS
Dear Logan Maradiaga,     It was our pleasure to care for you here at formerly [facility]  It is our hope that we were always able to exceed the expected standards for your care during your stay  You were hospitalized due to Cellulitis  You were cared for on the medical floor by Darwin Lindsay DO with the [facility] who covers for your primary care physician (PCP), JARETT Capellan, while you were hospitalized  If you have any questions or concerns related to this hospitalization, you may contact us at [phone]  For follow up as well as any medication refills, we recommend that you follow up with your primary care physician  A registered nurse will reach out to you by phone within a few days after your discharge to answer any additional questions that you may have after going home  However, at this time we provide for you here, the most important instructions / recommendations at discharge:     · Notable Medication Adjustments -   · Bactrim DS PO twice daily for 7 days  · Testing Required after Discharge -   · none  · Important follow up information -   · Follow-up with PCP within 7 days  · Other Instructions -   · Please return to ED for evaluation if symptoms worsen or you develops   · Please review this entire after visit summary as additional general instructions including medication list, appointments, activity, diet, any pertinent wound care, and other additional recommendations from your care team that may be provided for you        Sincerely,     Darwin Lindsay DO Headache    Meniere disease    Vertigo

## 2021-09-25 NOTE — H&P
[facility]  H&P- Shree Kay 1985, 31 y o  male MRN: [id number]  Unit/Bed#: ED 08 Encounter: [id number]  Primary Care Provider: JARETT Lamas   Date and time admitted to hospital: 9/24/2021  9:13 PM    * Left arm cellulitis  Assessment & Plan  · Patient reported rapid change in symptoms with she streaking redness up his arm  Was seen at urgent care and sent to the ER for further evaluation  · Ortho consult  · Follow-up blood cultures and procalcitonin level  · Continue antibiotics started in the ER with cefepime and vancomycin    Chewing tobacco nicotine dependence without complication  Assessment & Plan  · Chews 1 can every 3-4 days  · nicorette gum requested    Anxiety  Assessment & Plan  · effexor 75mg daily    VTE Prophylaxis: low risk by age  Code Status: full code  Discussion with patient    Anticipated Length of Stay:  Patient will be admitted on an Inpatient basis with an anticipated length of stay of  > 2 midnights  Justification for Hospital Stay: IV abx    Chief Complaint:   Left arm redness and swelling    History of Present Illness:    Shree Kay is a 31 y o  male who has past medical history of asthma, depression, anxiety presents with left arm redness and swelling  Patient reports that Saturday he was swimming in the [facility] after running 20 miles  He noted a black spot started on Sunday  He noted left elbow wound oozing, was warm, tender to touch and had a red line streaking up his arm  He also noted fever, chills, generalized weakness and headache  He was seen in urgent care and sent to the ER for further evaluation  His father had similar admission for cellulitis in his feet, that he helped him get in the car  ER treatment including cefepime 2000 mg, Toradol 15 mg, 1 L IV fluids, vancomycin 1750 mg  Review of Systems:    Review of Systems   Constitutional: Positive for chills, fatigue and fever     HENT: Negative for rhinorrhea, sore throat and trouble swallowing  Eyes: Negative for discharge and redness  Respiratory: Negative for cough and shortness of breath  Cardiovascular: Negative for chest pain and leg swelling  Gastrointestinal: Positive for nausea  Negative for abdominal pain, diarrhea and vomiting  Genitourinary: Positive for dysuria  Negative for hematuria  Musculoskeletal: Positive for arthralgias  Negative for back pain and neck pain  Skin: Positive for color change and wound  Neurological: Positive for dizziness, weakness and headaches  Hematological: Positive for adenopathy  Psychiatric/Behavioral: Negative for agitation and confusion  Past Medical and Surgical History:     Past Medical History:   Diagnosis Date    Asthma        Past Surgical History:   Procedure Laterality Date    BONE RESECTION, HUMERUS Right     EYE SURGERY Bilateral     TONSILECTOMY AND ADNOIDECTOMY         Meds/Allergies:    Prior to Admission medications    Medication Sig Start Date End Date Taking?  Authorizing Provider   venlafaxine (EFFEXOR-XR) 37 5 mg 24 hr capsule Take 37 5 mg by mouth daily Two pills daily   Yes Historical Provider, MD   sertraline (ZOLOFT) 25 mg tablet Take 25 mg by mouth daily  6/22/21   Historical Provider, MD     all medications and allergies reviewed    Allergies: No Known Allergies    Social History:     Marital Status: /Civil Union   Occupation: nursing student  Patient Pre-hospital Living Situation:  Home  Patient Pre-hospital Level of Mobility:  Mobile  Patient Pre-hospital Diet Restrictions:  None  Substance Use History:   Social History     Substance and Sexual Activity   Alcohol Use Yes    Comment: 6 pk week      Social History     Tobacco Use   Smoking Status Never Smoker   Smokeless Tobacco Current User    Types: Chew     Social History     Substance and Sexual Activity   Drug Use Not Currently       Family History:  I have reviewed the patient's family history    Physical Exam: Vitals:   Blood Pressure: 156/98 (09/24/21 2116)  Pulse: 78 (09/24/21 2116)  Temperature: 100 1 °F (37 8 °C) (09/24/21 2116)  Temp Source: Temporal (09/24/21 2116)  Respirations: 20 (09/24/21 2116)  Height: 6' (182 9 cm) (09/24/21 2116)  Weight - Scale: 90 7 kg (200 lb) (09/24/21 2116)  SpO2: 98 % (09/24/21 2116)    Physical Exam  Vitals reviewed  Constitutional:       General: He is not in acute distress  Appearance: Normal appearance  HENT:      Head: Normocephalic and atraumatic  Right Ear: External ear normal       Left Ear: External ear normal       Nose: Nose normal    Eyes:      General:         Right eye: No discharge  Left eye: No discharge  Conjunctiva/sclera: Conjunctivae normal    Cardiovascular:      Rate and Rhythm: Normal rate and regular rhythm  Heart sounds: Normal heart sounds  No murmur heard  Pulmonary:      Effort: Pulmonary effort is normal  No respiratory distress  Breath sounds: Normal breath sounds  No wheezing or rales  Abdominal:      General: Bowel sounds are normal  There is no distension  Palpations: Abdomen is soft  Tenderness: There is no abdominal tenderness  There is no guarding  Musculoskeletal:         General: Normal range of motion  Cervical back: Normal range of motion  Lymphadenopathy:      Comments: +redness/tenderness to left midarm, no axilla tenderness   Skin:     General: Skin is warm and dry  Neurological:      Mental Status: He is alert and oriented to person, place, and time  Mental status is at baseline  Psychiatric:         Mood and Affect: Mood normal          Behavior: Behavior normal          Thought Content: Thought content normal          Judgment: Judgment normal          Additional Data:     Lab Results: I have personally reviewed pertinent reports        Results from last 7 days   Lab Units 09/24/21 2153   WBC Thousand/uL 10 10   HEMOGLOBIN g/dL 14 9   HEMATOCRIT % 42 8   PLATELETS Thousands/uL 197   NEUTROS PCT % 84*   LYMPHS PCT % 9*   MONOS PCT % 5   EOS PCT % 1     Results from last 7 days   Lab Units 09/24/21  2153   SODIUM mmol/L 138   POTASSIUM mmol/L 3 8   CHLORIDE mmol/L 102   CO2 mmol/L 27   BUN mg/dL 16   CREATININE mg/dL 0 95   ANION GAP mmol/L 9   CALCIUM mg/dL 9 7   ALBUMIN g/dL 4 7   TOTAL BILIRUBIN mg/dL 0 40   ALK PHOS U/L 74   ALT U/L 14   AST U/L 13   GLUCOSE RANDOM mg/dL 102*     Results from last 7 days   Lab Units 09/24/21  2153   INR  0 93             Results from last 7 days   Lab Units 09/24/21  2153   LACTIC ACID mmol/L 0 8     Imaging: Formal read pending  XR elbow 3+ vw LEFT    (Results Pending)     [facility] Everywhere Records Reviewed: Yes    ** Please Note: This note has been constructed using a voice recognition system   **

## 2021-09-25 NOTE — UTILIZATION REVIEW
Initial Clinical Review    Admission: Date/Time/Statement:   Admission Orders (From admission, onward)     Ordered        09/24/21 2305  Inpatient Admission  Once                   Orders Placed This Encounter   Procedures    Inpatient Admission     Standing Status:   Standing     Number of Occurrences:   1     Order Specific Question:   Level of Care     Answer:   Med Surg [16]     Order Specific Question:   Estimated length of stay     Answer:   More than 2 Midnights     Order Specific Question:   Certification     Answer:   I certify that inpatient services are medically necessary for this patient for a duration of greater than two midnights  See H&P and MD Progress Notes for additional information about the patient's course of treatment  ED Arrival Information     Expected Arrival Acuity    9/24/2021 9/24/2021 20:59 Urgent         Means of arrival Escorted by Service Admission type    - - Hospitalist Urgent         Arrival complaint    Cellulitis of left elbow        Chief Complaint   Patient presents with    Cellulitis     Sunday bump on L elbow, now open and oozing  Warm, sensitive, red line up the arm  Initial Presentation: 32 yo m presents to the Ed with worsening cellulitis of his L elbow with associated chills, headache, he was seen at an Urgent care and referred to the ED as he was feebrile and had streaking up his arm  Form the area  He reports he noticed it two days prior  And  It has worsened since then  He is admitted inpatient  Started on ABx's , ortho consult, ad follow up o blood cultures and procalcitonin level  Date: 9/25/2021   Day 2:  He reports some pain with flexion - extension of the L elbow  He continues on IV Abx's  Orthopedic consult - 9/25 - L elbow cellulitis  , r/o septic joint, He reports he noticed a black dot on his elbow 3 days prior , now with red streaking  Which had continued to worsen   He reports swimming in the [facility] to noticing the symptoms  He has been started on OV Abx's  His elbow is draining currently,  No surgical intervention, continue abx;s  Warm moist compresses  Follow blood cultures       ED Triage Vitals [09/24/21 2116]   Temperature Pulse Respirations Blood Pressure SpO2   100 1 °F (37 8 °C) 78 20 156/98 98 %      Temp Source Heart Rate Source Patient Position - Orthostatic VS BP Location FiO2 (%)   Temporal Monitor Sitting Right arm --      Pain Score       4          Wt Readings from Last 1 Encounters:   09/24/21 90 7 kg (200 lb)     Additional Vital Signs:     Date/Time  Temp  Pulse  Resp  BP  MAP (mmHg)  SpO2  O2 Device  Patient Position - Orthostatic VS   09/25/21 1120  97 7 °F (36 5 °C)  --  --  --  --  --  --  --   09/25/21 0725  --  66  --  126/74  96  97 %  --  --           Pertinent Labs/Diagnostic Test Results:       Results from last 7 days   Lab Units 09/25/21  0728 09/24/21  2153   WBC Thousand/uL 8 10 10 10   HEMOGLOBIN g/dL 13 5* 14 9   HEMATOCRIT % 39 7* 42 8   PLATELETS Thousands/uL 174 197   NEUTROS ABS Thousands/µL  --  8 60*         Results from last 7 days   Lab Units 09/25/21  0728 09/24/21  2153   SODIUM mmol/L 139 138   POTASSIUM mmol/L 4 1 3 8   CHLORIDE mmol/L 106 102   CO2 mmol/L 29 27   ANION GAP mmol/L 4 9   BUN mg/dL 12 16   CREATININE mg/dL 0 99 0 95   EGFR ml/min/1 73sq m 99 104   CALCIUM mg/dL 8 4* 9 7     Results from last 7 days   Lab Units 09/24/21  2153   AST U/L 13   ALT U/L 14   ALK PHOS U/L 74   TOTAL PROTEIN g/dL 7 9   ALBUMIN g/dL 4 7   TOTAL BILIRUBIN mg/dL 0 40     Results from last 7 days   Lab Units 09/25/21  0728 09/24/21  2153   GLUCOSE RANDOM mg/dL 91 102*       Results from last 7 days   Lab Units 09/24/21  2153   TROPONIN I ng/mL <0 03         Results from last 7 days   Lab Units 09/24/21  2153   PROTIME seconds 12 6   INR  0 93   PTT seconds 33     Results from last 7 days   Lab Units 09/24/21  2153   LACTIC ACID mmol/L 0 8     CT UPPER EXTREMITY - LEFT  - 9/25 - Subcutaneous edema overlying the posterior elbow and forearm likely representing cellulitis   Study is limited by absence of intravenous contrast however no discrete collection is seen to suggest abscess  Upper L elbow - 9/25 - Diffuse subcutaneous edema and swelling, consistent with reported cellulitis   No osseous findings to suggest osteomyelitis     ECG - 9/24 - NSR - rate 74 - nonspecific ST- T wave changes      ED Treatment:   Medication Administration from 09/24/2021 2036 to 09/25/2021 1618       Date/Time Order Dose Route Action Comments     09/24/2021 2205 sodium chloride 0 9 % bolus 1,000 mL 1,000 mL Intravenous New Bag      09/24/2021 2245 vancomycin (VANCOCIN) 1,750 mg in sodium chloride 0 9 % 500 mL IVPB 1,750 mg Intravenous New Bag      09/24/2021 2206 cefepime (MAXIPIME) IVPB (premix in dextrose) 2,000 mg 50 mL 2,000 mg Intravenous New Bag      09/24/2021 2334 ketorolac (TORADOL) injection 15 mg 15 mg Intravenous Given      09/25/2021 0945 sodium chloride 0 9 % infusion 125 mL/hr Intravenous New Bag      09/25/2021 0011 sodium chloride 0 9 % infusion 125 mL/hr Intravenous New Bag      09/25/2021 1203 ketorolac (TORADOL) injection 30 mg 30 mg Intravenous Given      09/25/2021 0735 ketorolac (TORADOL) injection 30 mg 30 mg Intravenous Given      09/25/2021 0920 cefepime (MAXIPIME) IVPB (premix in dextrose) 2,000 mg 50 mL 2,000 mg Intravenous New Bag      09/25/2021 0731 vancomycin (VANCOCIN) 1,250 mg in sodium chloride 0 9 % 250 mL IVPB 1,250 mg Intravenous New Bag         Past Medical History:   Diagnosis Date    Asthma      Present on Admission:   Left arm cellulitis   Chewing tobacco nicotine dependence without complication   Anxiety      Admitting Diagnosis: Cellulitis [L03 90]  Age/Sex: 31 y o  male     Admission Orders:  Scheduled Medications:  cefepime, 2,000 mg, Intravenous, Q12H  ketorolac, 30 mg, Intravenous, Q6H [facility]  vancomycin, 15 mg/kg, Intravenous, Q8H        Continuous IV Infusions:  NSS @ 125 ml/hr- d/c'd 9/25 @ 12:13     PRN Meds:  acetaminophen, 650 mg, Oral, Q4H PRN  nicotine polacrilex, 2 mg, Oral, Q2H PRN  ondansetron, 4 mg, Intravenous, Q6H PRN    Nursing orders -  VS  -  up & OOB as tolerated -  Diet regular house     Network Utilization Review Department  ATTENTION: Please call with any questions or concerns to [phone] and carefully listen to the prompts so that you are directed to the right person  All voicemails are confidential   [place] [facility] all requests for admission clinical reviews, approved or denied determinations and any other requests to dedicated fax number below belonging to the campus where the patient is receiving treatment   List of dedicated fax numbers for the Facilities:  [address] DENIALS (Administrative/Medical Necessity) [phone]   [address] (Maternity/NICU/Pediatrics) [phone] [address] [place] [address] [id number] [address] [place] [address] [facility] [facility] [phone]

## 2021-09-27 ENCOUNTER — TRANSITIONAL CARE MANAGEMENT (OUTPATIENT)
Dept: FAMILY MEDICINE CLINIC | Facility: CLINIC | Age: 36
End: 2021-09-27

## 2021-09-28 ENCOUNTER — TELEPHONE (OUTPATIENT)
Dept: OBGYN CLINIC | Facility: HOSPITAL | Age: 36
End: 2021-09-28

## 2021-09-28 NOTE — TELEPHONE ENCOUNTER
Dr Celine Allen - Consult done inpatient for L arm cellulitis  No follow up is mentioned with Ortho on discharge  Just checking if this is correct?

## 2021-09-28 NOTE — TELEPHONE ENCOUNTER
Left msg for patient to call office to schedule appt with Dr Byron Ny for L arm cellulitis for 2 week FU

## 2021-09-30 ENCOUNTER — OFFICE VISIT (OUTPATIENT)
Dept: FAMILY MEDICINE CLINIC | Facility: CLINIC | Age: 36
End: 2021-09-30
Payer: COMMERCIAL

## 2021-09-30 VITALS
HEART RATE: 69 BPM | OXYGEN SATURATION: 99 % | DIASTOLIC BLOOD PRESSURE: 90 MMHG | WEIGHT: 200 LBS | SYSTOLIC BLOOD PRESSURE: 132 MMHG | HEIGHT: 72 IN | BODY MASS INDEX: 27.09 KG/M2 | TEMPERATURE: 98.4 F

## 2021-09-30 DIAGNOSIS — L03.114 CELLULITIS OF LEFT ELBOW: ICD-10-CM

## 2021-09-30 DIAGNOSIS — Z76.89 ENCOUNTER FOR SUPPORT AND COORDINATION OF TRANSITION OF CARE: Primary | ICD-10-CM

## 2021-09-30 LAB
BACTERIA BLD CULT: NORMAL
BACTERIA BLD CULT: NORMAL

## 2021-09-30 PROCEDURE — 1111F DSCHRG MED/CURRENT MED MERGE: CPT | Performed by: NURSE PRACTITIONER

## 2021-09-30 PROCEDURE — 99495 TRANSJ CARE MGMT MOD F2F 14D: CPT | Performed by: NURSE PRACTITIONER

## 2021-09-30 RX ORDER — MULTIVITAMIN
1 TABLET ORAL DAILY
COMMUNITY

## 2021-09-30 RX ORDER — HYDROXYZINE HYDROCHLORIDE 25 MG/1
25 TABLET, FILM COATED ORAL 3 TIMES DAILY PRN
COMMUNITY
Start: 2021-08-08

## 2021-09-30 RX ORDER — OMEGA-3S/DHA/EPA/FISH OIL/D3 300MG-1000
400 CAPSULE ORAL DAILY
COMMUNITY

## 2021-09-30 NOTE — PROGRESS NOTES
Transition of Care  Follow-up After Hospitalization    Reji Isidro 31 y o  male   Date:  9/30/2021    TCM Call (since 8/30/2021)     Date and time call was made  9/27/2021  7:17 AM    Hospital care reviewed  Records reviewed    Patient was hospitialized at  [address]        Date of Admission  09/24/21    Date of discharge  09/25/21    Diagnosis  Cellulitis of left arm     Disposition  Home      TCM Call (since 8/30/2021)     Scheduled for follow up? Yes    Did you obtain your prescribed medications  Yes    Do you need help managing your prescriptions or medications  No    Is transportation to your appointment needed  No    I have advised the patient to call PCP with any new or worsening symptoms  Kori Merrill Andrukaitis RMA    Are you recieving any outpatient services  No    Are you recieving home care services  No    Are you using any community resources  No    Current waiver services  No    Have you fallen in the last 12 months  No    Interperter language line needed  No    Counseling  Patient          Hospital records were reviewed  Medications upon discharge reviewed/updated  Medication Changes: PO bactrim until gone  Imaging: XR left elbow, CT LUE  Consults: Ortho  Follow up visits with other specialists: none      Assessment and Plan:    Mark Blanco was seen today for transition of care management  Diagnoses and all orders for this visit:    Encounter for support and coordination of transition of care    Cellulitis of left elbow            HPI:  Reji Isidro present for TCM visit s/p hospitalization for cellulitis of the left elbow  Pt reports that he had a bug bite to the left elbow and developed erythema, warmth, streaking, sen sensitivity, and fever  He was cleared by Ortho, given IV Abx, transitioned to PO Abx  Pt has been doing DSG changes at home  Pt reports improvement feels well and needs a note for school to participate in clinicals  ROS: Review of Systems   Skin: Positive for wound  All other systems reviewed and are negative  Past Medical History:   Diagnosis Date    Asthma        Past Surgical History:   Procedure Laterality Date    BONE RESECTION, HUMERUS Right     EYE SURGERY Bilateral     TONSILECTOMY AND ADNOIDECTOMY         Social History     Socioeconomic History    Marital status: /Civil Union     Spouse name: None    Number of children: None    Years of education: None    Highest education level: None   Occupational History    Occupation: unemployed    Tobacco Use    Smoking status: Never Smoker    Smokeless tobacco: Current User     Types: Chew   Vaping Use    Vaping Use: Never used   Substance and Sexual Activity    Alcohol use: Yes     Comment: 6 pk week     Drug use: Not Currently    Sexual activity: None   Other Topics Concern    None   Social History Narrative    None     Social Determinants of Health     Financial Resource Strain:     Difficulty of Paying Living Expenses:    Food Insecurity:     Worried About Running Out of Food in the Last Year:     Ran Out of Food in the Last Year:    Transportation Needs:     Lack of Transportation (Medical):      Lack of Transportation (Non-Medical):    Physical Activity:     Days of Exercise per Week:     Minutes of Exercise per Session:    Stress:     Feeling of Stress :    Social Connections:     Frequency of Communication with Friends and Family:     Frequency of Social Gatherings with Friends and Family:     Attends Temple Services:     Active Member of Clubs or Organizations:     Attends Club or Organization Meetings:     Marital Status:    Intimate Partner Violence:     Fear of Current or Ex-Partner:     Emotionally Abused:     Physically Abused:     Sexually Abused:        Family History   Problem Relation Age of Onset    Prostate cancer Father     Cancer Father         prostate       No Known Allergies      Current Outpatient Medications:     cholecalciferol (VITAMIN D3) 400 units tablet, Take 400 Units by mouth daily, Disp: , Rfl:     hydrOXYzine HCL (ATARAX) 25 mg tablet, Take 25 mg by mouth 3 (three) times a day as needed, Disp: , Rfl:     Multiple Vitamin (multivitamin) tablet, Take 1 tablet by mouth daily, Disp: , Rfl:     sulfamethoxazole-trimethoprim (BACTRIM DS) 800-160 mg per tablet, Take 1 tablet by mouth every 12 (twelve) hours for 7 days, Disp: 14 tablet, Rfl: 0    venlafaxine (EFFEXOR-XR) 37 5 mg 24 hr capsule, Take 37 5 mg by mouth daily Two pills daily, Disp: , Rfl:     nicotine polacrilex (NICORETTE) 2 mg gum, Chew 1 each (2 mg total) every 2 (two) hours as needed for smoking cessation Do not exceed 24 pieces in 24 hours  (Patient not taking: Reported on 9/30/2021), Disp: 100 each, Rfl: 0      Physical Exam:  /90 (BP Location: Right arm, Patient Position: Sitting, Cuff Size: Large)   Pulse 69   Temp 98 4 °F (36 9 °C) (Tympanic)   Ht 6' (1 829 m)   Wt 90 7 kg (200 lb)   SpO2 99%   BMI 27 12 kg/m²     Physical Exam  Vitals and nursing note reviewed  Constitutional:       Appearance: Normal appearance  He is well-developed  HENT:      Head: Normocephalic and atraumatic  Right Ear: Tympanic membrane, ear canal and external ear normal       Left Ear: Tympanic membrane, ear canal and external ear normal       Nose: Nose normal       Mouth/Throat:      Mouth: Mucous membranes are moist       Pharynx: Uvula midline  Eyes:      General: Lids are normal       Conjunctiva/sclera: Conjunctivae normal       Pupils: Pupils are equal, round, and reactive to light  Neck:      Thyroid: No thyroid mass  Vascular: No JVD  Trachea: Trachea and phonation normal    Cardiovascular:      Rate and Rhythm: Normal rate and regular rhythm  Pulses: Normal pulses  Heart sounds: Normal heart sounds, S1 normal and S2 normal  No murmur heard  No friction rub  No gallop      Pulmonary:      Effort: Pulmonary effort is normal       Breath sounds: Normal breath sounds  Abdominal:      General: Bowel sounds are normal       Palpations: Abdomen is soft  Tenderness: There is no abdominal tenderness  Genitourinary:     Comments: Deferred  Musculoskeletal:         General: Normal range of motion  Cervical back: Full passive range of motion without pain, normal range of motion and neck supple  Right lower leg: No edema  Left lower leg: No edema  Lymphadenopathy:      Head:      Right side of head: No submental, submandibular, tonsillar, preauricular, posterior auricular or occipital adenopathy  Left side of head: No submental, submandibular, tonsillar, preauricular, posterior auricular or occipital adenopathy  Cervical: No cervical adenopathy  Skin:     General: Skin is warm and dry  Capillary Refill: Capillary refill takes less than 2 seconds  Neurological:      General: No focal deficit present  Mental Status: He is alert and oriented to person, place, and time  Cranial Nerves: Cranial nerves are intact  Sensory: Sensation is intact  Motor: Motor function is intact  Coordination: Coordination is intact  Gait: Gait is intact  Psychiatric:         Attention and Perception: Attention and perception normal          Mood and Affect: Mood and affect normal          Speech: Speech normal          Behavior: Behavior normal  Behavior is cooperative  Thought Content:  Thought content normal          Cognition and Memory: Cognition normal          Judgment: Judgment normal                  Labs:  Lab Results   Component Value Date    WBC 8 10 09/25/2021    HGB 13 5 (L) 09/25/2021    HCT 39 7 (L) 09/25/2021    MCV 84 09/25/2021     09/25/2021     Lab Results   Component Value Date    K 4 1 09/25/2021     09/25/2021    CO2 29 09/25/2021    BUN 12 09/25/2021    CREATININE 0 99 09/25/2021    GLUF 90 07/20/2021    CALCIUM 8 4 (L) 09/25/2021    AST 13 09/24/2021    ALT 14 09/24/2021 ALKPHOS 74 09/24/2021    EGFR 99 09/25/2021

## 2021-09-30 NOTE — LETTER
September 30, 2021     Patient: Reji Isidro   YOB: 1985   Date of Visit: 9/30/2021       To Whom it May Concern:    Reji Isidro is under my professional care  He was seen in my office on 9/30/2021  He may return to call and clinical as of 9/30/2021    If you have any questions or concerns, please don't hesitate to call           Sincerely,          JARETT Mejia        CC: Reji Isidro

## 2021-11-12 DIAGNOSIS — Z20.822 EXPOSURE TO COVID-19 VIRUS: ICD-10-CM

## 2021-11-12 DIAGNOSIS — B34.9 VIRAL INFECTION, UNSPECIFIED: ICD-10-CM

## 2021-11-12 PROCEDURE — 0241U HB NFCT DS VIR RESP RNA 4 TRGT: CPT | Performed by: NURSE PRACTITIONER

## 2021-11-13 LAB
FLUAV RNA RESP QL NAA+PROBE: NEGATIVE
FLUBV RNA RESP QL NAA+PROBE: NEGATIVE
RSV RNA RESP QL NAA+PROBE: NEGATIVE
SARS-COV-2 RNA RESP QL NAA+PROBE: NEGATIVE

## 2023-02-15 ENCOUNTER — TELEPHONE (OUTPATIENT)
Dept: FAMILY MEDICINE CLINIC | Facility: CLINIC | Age: 38
End: 2023-02-15

## 2023-02-23 NOTE — TELEPHONE ENCOUNTER
02/23/23 11:38 AM        The office's request has been received, reviewed, and the patient chart updated  The PCP has successfully been removed with a patient attribution note  This message will now be completed          Thank you  Ehsan Greene

## 2025-08-04 ENCOUNTER — TELEPHONE (OUTPATIENT)
Age: 40
End: 2025-08-04